# Patient Record
Sex: MALE | Race: WHITE | Employment: OTHER | URBAN - METROPOLITAN AREA
[De-identification: names, ages, dates, MRNs, and addresses within clinical notes are randomized per-mention and may not be internally consistent; named-entity substitution may affect disease eponyms.]

---

## 2019-05-31 ENCOUNTER — HOSPITAL ENCOUNTER (OUTPATIENT)
Dept: MRI IMAGING | Age: 54
Discharge: HOME | End: 2019-05-31
Payer: COMMERCIAL

## 2019-05-31 DIAGNOSIS — M48.02 CERVICAL SPINAL STENOSIS: ICD-10-CM

## 2019-05-31 PROCEDURE — 72141 MRI NECK SPINE W/O DYE: CPT

## 2019-06-27 ENCOUNTER — HOSPITAL ENCOUNTER (OUTPATIENT)
Dept: MRI IMAGING | Age: 54
Discharge: HOME | End: 2019-06-27
Payer: COMMERCIAL

## 2019-06-27 VITALS — WEIGHT: 230 LBS

## 2019-06-27 DIAGNOSIS — N88.8 CERVICAL CYST: ICD-10-CM

## 2019-06-27 PROCEDURE — 74011250636 HC RX REV CODE- 250/636: Performed by: RADIOLOGY

## 2019-06-27 PROCEDURE — A9575 INJ GADOTERATE MEGLUMI 0.1ML: HCPCS | Performed by: RADIOLOGY

## 2019-06-27 PROCEDURE — 72142 MRI NECK SPINE W/DYE: CPT

## 2019-06-27 RX ORDER — GADOTERATE MEGLUMINE 376.9 MG/ML
20 INJECTION INTRAVENOUS
Status: COMPLETED | OUTPATIENT
Start: 2019-06-27 | End: 2019-06-27

## 2019-06-27 RX ADMIN — GADOTERATE MEGLUMINE 20 ML: 376.9 INJECTION INTRAVENOUS at 09:50

## 2019-08-05 ENCOUNTER — HOSPITAL ENCOUNTER (EMERGENCY)
Age: 54
Discharge: HOME | End: 2019-08-05
Attending: EMERGENCY MEDICINE
Payer: COMMERCIAL

## 2019-08-05 VITALS
RESPIRATION RATE: 16 BRPM | HEIGHT: 75 IN | WEIGHT: 244.49 LBS | BODY MASS INDEX: 30.4 KG/M2 | TEMPERATURE: 97.6 F | DIASTOLIC BLOOD PRESSURE: 76 MMHG | SYSTOLIC BLOOD PRESSURE: 123 MMHG | OXYGEN SATURATION: 87 % | HEART RATE: 75 BPM

## 2019-08-05 DIAGNOSIS — R55 NEAR SYNCOPE: Primary | ICD-10-CM

## 2019-08-05 DIAGNOSIS — T50.905A ADVERSE EFFECT OF DRUG, INITIAL ENCOUNTER: ICD-10-CM

## 2019-08-05 LAB
ALBUMIN SERPL-MCNC: 3.9 G/DL (ref 3.5–5)
ALBUMIN/GLOB SERPL: 1.1 {RATIO} (ref 1.1–2.2)
ALP SERPL-CCNC: 65 U/L (ref 45–117)
ALT SERPL-CCNC: 71 U/L (ref 12–78)
ANION GAP SERPL CALC-SCNC: 5 MMOL/L (ref 5–15)
APPEARANCE UR: CLEAR
AST SERPL-CCNC: 34 U/L (ref 15–37)
ATRIAL RATE: 78 BPM
BACTERIA URNS QL MICRO: NEGATIVE /HPF
BASOPHILS # BLD: 0 K/UL (ref 0–0.1)
BASOPHILS NFR BLD: 1 % (ref 0–1)
BILIRUB SERPL-MCNC: 0.6 MG/DL (ref 0.2–1)
BILIRUB UR QL: NEGATIVE
BUN SERPL-MCNC: 12 MG/DL (ref 6–20)
BUN/CREAT SERPL: 12 (ref 12–20)
CALCIUM SERPL-MCNC: 8.8 MG/DL (ref 8.5–10.1)
CALCULATED P AXIS, ECG09: 37 DEGREES
CALCULATED R AXIS, ECG10: -10 DEGREES
CALCULATED T AXIS, ECG11: 36 DEGREES
CHLORIDE SERPL-SCNC: 103 MMOL/L (ref 97–108)
CO2 SERPL-SCNC: 32 MMOL/L (ref 21–32)
COLOR UR: ABNORMAL
CREAT SERPL-MCNC: 0.99 MG/DL (ref 0.7–1.3)
DIAGNOSIS, 93000: NORMAL
DIFFERENTIAL METHOD BLD: NORMAL
EOSINOPHIL # BLD: 0.1 K/UL (ref 0–0.4)
EOSINOPHIL NFR BLD: 2 % (ref 0–7)
EPITH CASTS URNS QL MICRO: ABNORMAL /LPF
ERYTHROCYTE [DISTWIDTH] IN BLOOD BY AUTOMATED COUNT: 12.7 % (ref 11.5–14.5)
GLOBULIN SER CALC-MCNC: 3.7 G/DL (ref 2–4)
GLUCOSE SERPL-MCNC: 101 MG/DL (ref 65–100)
GLUCOSE UR STRIP.AUTO-MCNC: NEGATIVE MG/DL
HCT VFR BLD AUTO: 44.6 % (ref 36.6–50.3)
HGB BLD-MCNC: 15.1 G/DL (ref 12.1–17)
HGB UR QL STRIP: ABNORMAL
KETONES UR QL STRIP.AUTO: NEGATIVE MG/DL
LEUKOCYTE ESTERASE UR QL STRIP.AUTO: NEGATIVE
LYMPHOCYTES # BLD: 2 K/UL
LYMPHOCYTES NFR BLD: 42 % (ref 12–49)
MCH RBC QN AUTO: 32.3 PG (ref 26–34)
MCHC RBC AUTO-ENTMCNC: 33.9 G/DL (ref 30–36.5)
MCV RBC AUTO: 95.3 FL (ref 80–99)
MONOCYTES # BLD: 0.4 K/UL (ref 0–1)
MONOCYTES NFR BLD: 9 % (ref 5–13)
NEUTS SEG # BLD: 2.2 K/UL (ref 1.8–8)
NEUTS SEG NFR BLD: 46 % (ref 32–75)
NITRITE UR QL STRIP.AUTO: NEGATIVE
P-R INTERVAL, ECG05: 186 MS
PH UR STRIP: 7 [PH] (ref 5–8)
PLATELET # BLD AUTO: 221 K/UL (ref 150–400)
PMV BLD AUTO: 11.2 FL (ref 8.9–12.9)
POTASSIUM SERPL-SCNC: 4 MMOL/L (ref 3.5–5.1)
PROT SERPL-MCNC: 7.6 G/DL (ref 6.4–8.2)
PROT UR STRIP-MCNC: NEGATIVE MG/DL
Q-T INTERVAL, ECG07: 404 MS
QRS DURATION, ECG06: 108 MS
QTC CALCULATION (BEZET), ECG08: 460 MS
RBC # BLD AUTO: 4.68 M/UL (ref 4.1–5.7)
RBC #/AREA URNS HPF: ABNORMAL /HPF (ref 0–5)
SODIUM SERPL-SCNC: 140 MMOL/L (ref 136–145)
SP GR UR REFRACTOMETRY: 1.01 (ref 1–1.03)
UR CULT HOLD, URHOLD: NORMAL
UROBILINOGEN UR QL STRIP.AUTO: 0.2 EU/DL (ref 0.2–1)
VENTRICULAR RATE, ECG03: 78 BPM
WBC # BLD AUTO: 4.8 K/UL (ref 4.1–11.1)
WBC URNS QL MICRO: ABNORMAL /HPF (ref 0–4)

## 2019-08-05 PROCEDURE — 74011250636 HC RX REV CODE- 250/636: Performed by: EMERGENCY MEDICINE

## 2019-08-05 PROCEDURE — 85025 COMPLETE CBC W/AUTO DIFF WBC: CPT

## 2019-08-05 PROCEDURE — 80053 COMPREHEN METABOLIC PANEL: CPT

## 2019-08-05 PROCEDURE — 99284 EMERGENCY DEPT VISIT MOD MDM: CPT

## 2019-08-05 PROCEDURE — 36415 COLL VENOUS BLD VENIPUNCTURE: CPT

## 2019-08-05 PROCEDURE — 96360 HYDRATION IV INFUSION INIT: CPT

## 2019-08-05 PROCEDURE — 93005 ELECTROCARDIOGRAM TRACING: CPT

## 2019-08-05 PROCEDURE — 81001 URINALYSIS AUTO W/SCOPE: CPT

## 2019-08-05 RX ORDER — SULFAMETHOXAZOLE AND TRIMETHOPRIM 800; 160 MG/1; MG/1
1 TABLET ORAL 2 TIMES DAILY
COMMUNITY
End: 2019-08-20

## 2019-08-05 RX ADMIN — SODIUM CHLORIDE 1000 ML: 900 INJECTION, SOLUTION INTRAVENOUS at 11:22

## 2019-08-05 NOTE — ED TRIAGE NOTES
Pt ambulates to treatment area with steady gait he states that he felt dizzy like he wanted to pass out this morning after taking the Bactrim that he started 4 days ago. He states that when he turns his head he feels some fogginess but not really dizzy. He had some mild nausea as well but no vomiting. He is concerned he maybe allergic to the Bactrim or is having a reaction to it.   Dr Yamilex Ellison at the bedside

## 2019-08-05 NOTE — ED NOTES
Patient resting on the stretcher in no distress and currently without complaints. Call bell within reach; will continue to monitor. Is able to text on his phone; no nausea or dizziness at this time.

## 2019-08-05 NOTE — ED PROVIDER NOTES
78-year-old male with no significant past medical history presents with complaint of lightheadedness. He reports getting up and getting ready for work this morning and feeling fine. At approximately 9 AM while he was getting ready for work he began to feel slightly nauseated, and said he felt foggy and lightheaded and became clammy. He reports episode lasted about 20 minutes and resolved on its own. He did not do anything to make the symptoms resolved. He had not yet eaten breakfast but he reports that he does not usually eat breakfast in the morning. He did take Bactrim by mouth this morning prior to the episode on an empty stomach  And he is concerned this could cause the symptoms. He reports being put on the Bactrim approximately 4 days ago by his primary care doctor for some lower abdominal discomfort. He said states his doctor found some blood and bacteria in his urine and is treating him for prostatitis. He reports since starting the antibiotics the symptoms have resolved. He denies any fevers. He denies any headache. Patient denies chest pain or shortness of breath. No past medical history on file. No past surgical history on file. No family history on file.     Social History     Socioeconomic History    Marital status:      Spouse name: Not on file    Number of children: Not on file    Years of education: Not on file    Highest education level: Not on file   Occupational History    Not on file   Social Needs    Financial resource strain: Not on file    Food insecurity:     Worry: Not on file     Inability: Not on file    Transportation needs:     Medical: Not on file     Non-medical: Not on file   Tobacco Use    Smoking status: Not on file   Substance and Sexual Activity    Alcohol use: No    Drug use: No    Sexual activity: Not on file   Lifestyle    Physical activity:     Days per week: Not on file     Minutes per session: Not on file    Stress: Not on file   Relationships    Social connections:     Talks on phone: Not on file     Gets together: Not on file     Attends Rastafari service: Not on file     Active member of club or organization: Not on file     Attends meetings of clubs or organizations: Not on file     Relationship status: Not on file    Intimate partner violence:     Fear of current or ex partner: Not on file     Emotionally abused: Not on file     Physically abused: Not on file     Forced sexual activity: Not on file   Other Topics Concern    Not on file   Social History Narrative    Not on file         ALLERGIES: Pcn [penicillins]    Review of Systems   Constitutional: Negative for fever. Respiratory: Negative for shortness of breath. Cardiovascular: Negative for chest pain. Neurological: Positive for dizziness and light-headedness. Negative for weakness, numbness and headaches. All other systems reviewed and are negative. There were no vitals filed for this visit. Physical Exam   Constitutional: He is oriented to person, place, and time. He appears well-developed and well-nourished. HENT:   Head: Normocephalic and atraumatic. Eyes: Pupils are equal, round, and reactive to light. Conjunctivae and EOM are normal.   Neck: Normal range of motion. Cardiovascular: Normal rate, regular rhythm, normal heart sounds and intact distal pulses. No murmur heard. Pulmonary/Chest: Breath sounds normal. No stridor. No respiratory distress. He has no wheezes. He has no rales. Abdominal: Soft. Bowel sounds are normal. He exhibits no distension and no mass. There is no tenderness. There is no guarding. Musculoskeletal: Normal range of motion. Neurological: He is alert and oriented to person, place, and time. No cranial nerve deficit. Coordination normal.   Finger to nose intact   Skin: Skin is warm and dry. Nursing note and vitals reviewed.        MDM  Number of Diagnoses or Management Options  Adverse effect of drug, initial encounter:   Near syncope:   Diagnosis management comments: Sounds more like near syncope based on the symptoms patient describes. Is not consistent with a stroke. It does not sound vertiginous. It may be related to taking antibiotics on an empty stomach. We will check electrolytes, platelets and white count given on Bactrim and recheck urine. Also check orthostatics. Amount and/or Complexity of Data Reviewed  Clinical lab tests: ordered and reviewed  Independent visualization of images, tracings, or specimens: yes (ekg)    Patient Progress  Patient progress: stable         Procedures    ED EKG interpretation:  Rhythm: normal sinus rhythm; and regular . Rate (approx.): 80; Axis: normal; P wave: normal; QRS interval: normal ; ST/T wave: non-specific changes  EKG documented by Gilberto Tejeda MD, scribe, as interpreted by Janis Cervantes MD, ED MD       12:33 PM  Patient reports feeling completely better. He is ambulate around emergency department and has had no symptoms. I instructed him next time he takes his Bactrim not to take it on empty stomach. 12:33 PM  Patient's results have been reviewed with them. Patient and/or family have verbally conveyed their understanding and agreement of the patient's signs, symptoms, diagnosis, treatment and prognosis and additionally agree to follow up as recommended or return to the Emergency Room should their condition change prior to follow-up. Discharge instructions have also been provided to the patient with some educational information regarding their diagnosis as well a list of reasons why they would want to return to the ER prior to their follow-up appointment should their condition change.

## 2019-08-05 NOTE — ED NOTES
Pt got up to go to the bathroom after fluids completed, pt had no feelings of dizziness or fog in his head states he is feeling better, informed Dr Nissa Lou

## 2019-08-05 NOTE — ED NOTES
The patient was discharged home by Dr Yamilex Ellison in stable condition. The patient is alert and oriented, in no respiratory distress. The patient's diagnosis, condition and treatment were explained. The patient expressed understanding. A discharge plan has been developed. A  was not involved in the process. Aftercare instructions were given. Pt ambulatory out of the ED.

## 2019-08-05 NOTE — DISCHARGE INSTRUCTIONS
Patient Education        Lightheadedness or Faintness: Care Instructions  Your Care Instructions  Lightheadedness is a feeling that you are about to faint or \"pass out. \" You do not feel as if you or your surroundings are moving. It is different from vertigo, which is the feeling that you or things around you are spinning or tilting. Lightheadedness usually goes away or gets better when you lie down. If lightheadedness gets worse, it can lead to a fainting spell. It is common to feel lightheaded from time to time. Lightheadedness usually is not caused by a serious problem. It often is caused by a short-lasting drop in blood pressure and blood flow to your head that occurs when you get up too quickly from a seated or lying position. Follow-up care is a key part of your treatment and safety. Be sure to make and go to all appointments, and call your doctor if you are having problems. It's also a good idea to know your test results and keep a list of the medicines you take. How can you care for yourself at home? · Lie down for 1 or 2 minutes when you feel lightheaded. After lying down, sit up slowly and remain sitting for 1 to 2 minutes before slowly standing up. · Avoid movements, positions, or activities that have made you lightheaded in the past.  · Get plenty of rest, especially if you have a cold or flu, which can cause lightheadedness. · Make sure you drink plenty of fluids, especially if you have a fever or have been sweating. · Do not drive or put yourself and others in danger while you feel lightheaded. When should you call for help? Call 911 anytime you think you may need emergency care. For example, call if:    · You have symptoms of a stroke. These may include:  ? Sudden numbness, tingling, weakness, or loss of movement in your face, arm, or leg, especially on only one side of your body. ? Sudden vision changes. ? Sudden trouble speaking.   ? Sudden confusion or trouble understanding simple statements. ? Sudden problems with walking or balance. ? A sudden, severe headache that is different from past headaches.     · You have symptoms of a heart attack. These may include:  ? Chest pain or pressure, or a strange feeling in the chest.  ? Sweating. ? Shortness of breath. ? Nausea or vomiting. ? Pain, pressure, or a strange feeling in the back, neck, jaw, or upper belly or in one or both shoulders or arms. ? Lightheadedness or sudden weakness. ? A fast or irregular heartbeat. After you call 911, the  may tell you to chew 1 adult-strength or 2 to 4 low-dose aspirin. Wait for an ambulance. Do not try to drive yourself.    Watch closely for changes in your health, and be sure to contact your doctor if:    · Your lightheadedness gets worse or does not get better with home care. Where can you learn more? Go to http://evelynConviobraden.info/. Enter G776 in the search box to learn more about \"Lightheadedness or Faintness: Care Instructions. \"  Current as of: September 23, 2018  Content Version: 12.1  © 9034-6618 [employer]. Care instructions adapted under license by [employer] (which disclaims liability or warranty for this information). If you have questions about a medical condition or this instruction, always ask your healthcare professional. [place] [address] any warranty or liability for your use of this information. Patient Education     Side Effects of Medicine: Care Instructions  Your Care Instructions  Medicines are a big part of treatment for many health problems. But all medicines have side effects. Often these are mild problems. They might include a dry mouth or upset stomach. But sometimes medicines can cause dangerous side effects. One example is a bad allergic reaction. The best treatment will depend on what side effects you have. If you have a serious side effect, you may need to stop taking the medicine.  You may also need to take another medicine to treat the side effect. If you have a mild side effect, it may go away after you take the medicine for a while. The doctor has checked you carefully, but problems can develop later. If you notice any problems or new symptoms, get medical treatment right away. Follow-up care is a key part of your treatment and safety. Be sure to make and go to all appointments, and call your doctor if you are having problems. It's also a good idea to know your test results and keep a list of the medicines you take. How can you care for yourself at home? · Be safe with medicines. Take your medicines exactly as prescribed. Call your doctor if you think you are having a problem with your medicine. · Call your doctor if side effects bother you and you wonder if you should keep taking a medicine. Your doctor may be able to lower your dose or change your medicine. Do not suddenly quit taking your medicine unless a doctor tells you to. · Make sure your doctor has a list of all the medicines, vitamins, supplements, and herbal remedies you take. Ask about side effects. When should you call for help? Call 911 anytime you think you may need emergency care. For example, call if:  · You have symptoms of a severe allergic reaction. These may include:  ¨ Sudden raised, red areas (hives) all over your body. ¨ Swelling of the throat, mouth, lips, or tongue. ¨ Trouble breathing. ¨ Passing out (losing consciousness). Or you may feel very lightheaded or suddenly feel weak, confused, or restless. Call your doctor now or seek immediate medical care if:  · You have symptoms of an allergic reaction, such as:  ¨ A rash or hives (raised, red areas on the skin). ¨ Itching. ¨ Swelling. ¨ Belly pain, nausea, or vomiting. Watch closely for changes in your health, and be sure to contact your doctor if:  · You think you are having a new problem with your medicine. · You do not get better as expected.    Where can you learn more? Go to Indiewalls.be  Enter D152 in the search box to learn more about \"Side Effects of Medicine: Care Instructions. \"   © 4589-8767 Healthwise[employer]. Care instructions adapted under license by [employer] (which disclaims liability or warranty for this information). This care instruction is for use with your licensed healthcare professional. If you have questions about a medical condition or this instruction, always ask your healthcare professional. [place] [address] any warranty or liability for your use of this information.   Content Version: 92.6.948773; Current as of: November 20, 2015

## 2019-08-20 ENCOUNTER — APPOINTMENT (OUTPATIENT)
Dept: GENERAL RADIOLOGY | Age: 54
End: 2019-08-20
Attending: EMERGENCY MEDICINE
Payer: COMMERCIAL

## 2019-08-20 ENCOUNTER — APPOINTMENT (OUTPATIENT)
Dept: NON INVASIVE DIAGNOSTICS | Age: 54
End: 2019-08-20
Attending: EMERGENCY MEDICINE
Payer: COMMERCIAL

## 2019-08-20 ENCOUNTER — HOSPITAL ENCOUNTER (EMERGENCY)
Age: 54
Discharge: ANOTHER HOSPITAL | End: 2019-08-20
Attending: EMERGENCY MEDICINE | Admitting: EMERGENCY MEDICINE
Payer: COMMERCIAL

## 2019-08-20 VITALS
DIASTOLIC BLOOD PRESSURE: 72 MMHG | WEIGHT: 241 LBS | HEART RATE: 87 BPM | SYSTOLIC BLOOD PRESSURE: 115 MMHG | TEMPERATURE: 97.6 F | HEIGHT: 75 IN | BODY MASS INDEX: 29.97 KG/M2 | OXYGEN SATURATION: 94 % | RESPIRATION RATE: 16 BRPM

## 2019-08-20 DIAGNOSIS — R42 DIZZINESS: Primary | ICD-10-CM

## 2019-08-20 LAB
ALBUMIN SERPL-MCNC: 3.9 G/DL (ref 3.5–5)
ALBUMIN/GLOB SERPL: 1 {RATIO} (ref 1.1–2.2)
ALP SERPL-CCNC: 69 U/L (ref 45–117)
ALT SERPL-CCNC: 39 U/L (ref 12–78)
ANION GAP SERPL CALC-SCNC: 6 MMOL/L (ref 5–15)
AST SERPL-CCNC: 30 U/L (ref 15–37)
ATRIAL RATE: 78 BPM
BASOPHILS # BLD: 0 K/UL (ref 0–0.1)
BASOPHILS NFR BLD: 0 % (ref 0–1)
BILIRUB SERPL-MCNC: 0.6 MG/DL (ref 0.2–1)
BUN SERPL-MCNC: 10 MG/DL (ref 6–20)
BUN/CREAT SERPL: 12 (ref 12–20)
CALCIUM SERPL-MCNC: 9.4 MG/DL (ref 8.5–10.1)
CALCULATED P AXIS, ECG09: 24 DEGREES
CALCULATED R AXIS, ECG10: -13 DEGREES
CALCULATED T AXIS, ECG11: 34 DEGREES
CHLORIDE SERPL-SCNC: 106 MMOL/L (ref 97–108)
CK MB CFR SERPL CALC: NORMAL % (ref 0–2.5)
CK MB SERPL-MCNC: <1 NG/ML (ref 5–25)
CK SERPL-CCNC: 85 U/L (ref 39–308)
CO2 SERPL-SCNC: 31 MMOL/L (ref 21–32)
CREAT SERPL-MCNC: 0.85 MG/DL (ref 0.7–1.3)
D DIMER PPP FEU-MCNC: <0.19 MG/L FEU (ref 0–0.65)
DIAGNOSIS, 93000: NORMAL
DIFFERENTIAL METHOD BLD: NORMAL
ECHO AO ROOT DIAM: 3.79 CM
EOSINOPHIL # BLD: 0 K/UL (ref 0–0.4)
EOSINOPHIL NFR BLD: 0 % (ref 0–7)
ERYTHROCYTE [DISTWIDTH] IN BLOOD BY AUTOMATED COUNT: 12.5 % (ref 11.5–14.5)
GLOBULIN SER CALC-MCNC: 3.8 G/DL (ref 2–4)
GLUCOSE SERPL-MCNC: 99 MG/DL (ref 65–100)
HCT VFR BLD AUTO: 43.8 % (ref 36.6–50.3)
HGB BLD-MCNC: 15.3 G/DL (ref 12.1–17)
LYMPHOCYTES # BLD: 1.9 K/UL
LYMPHOCYTES NFR BLD: 34 % (ref 12–49)
MCH RBC QN AUTO: 32.8 PG (ref 26–34)
MCHC RBC AUTO-ENTMCNC: 34.9 G/DL (ref 30–36.5)
MCV RBC AUTO: 93.8 FL (ref 80–99)
MONOCYTES # BLD: 0.4 K/UL (ref 0–1)
MONOCYTES NFR BLD: 7 % (ref 5–13)
NEUTS SEG # BLD: 3.2 K/UL (ref 1.8–8)
NEUTS SEG NFR BLD: 59 % (ref 32–75)
P-R INTERVAL, ECG05: 190 MS
PLATELET # BLD AUTO: 218 K/UL (ref 150–400)
PMV BLD AUTO: 10.6 FL (ref 8.9–12.9)
POTASSIUM SERPL-SCNC: 4.5 MMOL/L (ref 3.5–5.1)
PROT SERPL-MCNC: 7.7 G/DL (ref 6.4–8.2)
Q-T INTERVAL, ECG07: 370 MS
QRS DURATION, ECG06: 100 MS
QTC CALCULATION (BEZET), ECG08: 421 MS
RBC # BLD AUTO: 4.67 M/UL (ref 4.1–5.7)
SODIUM SERPL-SCNC: 143 MMOL/L (ref 136–145)
STRESS ANGINA INDEX: 0
STRESS BASELINE DIAS BP: 65 MMHG
STRESS BASELINE HR: 74 BPM
STRESS BASELINE SYS BP: 113 MMHG
STRESS ESTIMATED WORKLOAD: 13.4 METS
STRESS EXERCISE DUR MIN: NORMAL
STRESS PEAK DIAS BP: 90 MMHG
STRESS PEAK SYS BP: 150 MMHG
STRESS PERCENT HR ACHIEVED: 111 %
STRESS POST PEAK HR: 187 BPM
STRESS RATE PRESSURE PRODUCT: NORMAL BPM*MMHG
STRESS SR DUKE TREADMILL SCORE: 0
STRESS ST DEPRESSION: 0 MM
STRESS ST ELEVATION: 0 MM
STRESS TARGET HR: 168 BPM
TROPONIN I BLD-MCNC: <0.04 NG/ML (ref 0–0.08)
VENTRICULAR RATE, ECG03: 78 BPM
WBC # BLD AUTO: 5.5 K/UL (ref 4.1–11.1)

## 2019-08-20 PROCEDURE — 99285 EMERGENCY DEPT VISIT HI MDM: CPT

## 2019-08-20 PROCEDURE — 71046 X-RAY EXAM CHEST 2 VIEWS: CPT

## 2019-08-20 PROCEDURE — 84484 ASSAY OF TROPONIN QUANT: CPT

## 2019-08-20 PROCEDURE — 93005 ELECTROCARDIOGRAM TRACING: CPT

## 2019-08-20 PROCEDURE — 93320 DOPPLER ECHO COMPLETE: CPT

## 2019-08-20 PROCEDURE — 36415 COLL VENOUS BLD VENIPUNCTURE: CPT

## 2019-08-20 PROCEDURE — 80053 COMPREHEN METABOLIC PANEL: CPT

## 2019-08-20 PROCEDURE — 85379 FIBRIN DEGRADATION QUANT: CPT

## 2019-08-20 PROCEDURE — 85025 COMPLETE CBC W/AUTO DIFF WBC: CPT

## 2019-08-20 PROCEDURE — 82550 ASSAY OF CK (CPK): CPT

## 2019-08-20 NOTE — DISCHARGE INSTRUCTIONS
Patient Education        Dizziness: Care Instructions  Your Care Instructions  Dizziness is the feeling of unsteadiness or fuzziness in your head. It is different than having vertigo, which is a feeling that the room is spinning or that you are moving or falling. It is also different from lightheadedness, which is the feeling that you are about to faint. It can be hard to know what causes dizziness. Some people feel dizzy when they have migraine headaches. Sometimes bouts of flu can make you feel dizzy. Some medical conditions, such as heart problems or high blood pressure, can make you feel dizzy. Many medicines can cause dizziness, including medicines for high blood pressure, pain, or anxiety. If a medicine causes your symptoms, your doctor may recommend that you stop or change the medicine. If it is a problem with your heart, you may need medicine to help your heart work better. If there is no clear reason for your symptoms, your doctor may suggest watching and waiting for a while to see if the dizziness goes away on its own. Follow-up care is a key part of your treatment and safety. Be sure to make and go to all appointments, and call your doctor if you are having problems. It's also a good idea to know your test results and keep a list of the medicines you take. How can you care for yourself at home? · If your doctor recommends or prescribes medicine, take it exactly as directed. Call your doctor if you think you are having a problem with your medicine. · Do not drive while you feel dizzy. · Try to prevent falls. Steps you can take include:  ? Using nonskid mats, adding grab bars near the tub, and using night-lights. ? Clearing your home so that walkways are free of anything you might trip on.  ? Letting family and friends know that you have been feeling dizzy. This will help them know how to help you. When should you call for help? Call 911 anytime you think you may need emergency care.  For example, call if:    · You passed out (lost consciousness).     · You have dizziness along with symptoms of a heart attack. These may include:  ? Chest pain or pressure, or a strange feeling in the chest.  ? Sweating. ? Shortness of breath. ? Nausea or vomiting. ? Pain, pressure, or a strange feeling in the back, neck, jaw, or upper belly or in one or both shoulders or arms. ? Lightheadedness or sudden weakness. ? A fast or irregular heartbeat.     · You have symptoms of a stroke. These may include:  ? Sudden numbness, tingling, weakness, or loss of movement in your face, arm, or leg, especially on only one side of your body. ? Sudden vision changes. ? Sudden trouble speaking. ? Sudden confusion or trouble understanding simple statements. ? Sudden problems with walking or balance. ? A sudden, severe headache that is different from past headaches.    Call your doctor now or seek immediate medical care if:    · You feel dizzy and have a fever, headache, or ringing in your ears.     · You have new or increased nausea and vomiting.     · Your dizziness does not go away or comes back.    Watch closely for changes in your health, and be sure to contact your doctor if:    · You do not get better as expected. Where can you learn more? Go to http://evelyn-braden.info/. Enter Q531 in the search box to learn more about \"Dizziness: Care Instructions. \"  Current as of: September 23, 2018  Content Version: 12.1  © 5498-2294 [employer]. Care instructions adapted under license by [employer] (which disclaims liability or warranty for this information). If you have questions about a medical condition or this instruction, always ask your healthcare professional. [place] [address] any warranty or liability for your use of this information. We hope that we have addressed all of your medical concerns.  The examination and treatment you received in the Emergency Department were for an emergent problem and were not intended as complete care. It is important that you follow up with your healthcare provider(s) for ongoing care. If your symptoms worsen or do not improve as expected, and you are unable to reach your usual health care provider(s), you should return to the Emergency Department. Today's healthcare is undergoing tremendous change, and patient satisfaction surveys are one of the many tools to assess the quality of medical care. You may receive a survey from the [facility] regarding your experience in the Emergency Department. I hope that your experience has been completely positive, particularly the medical care that I provided. As such, please participate in the survey; anything less than excellent does not meet my expectations or intentions. [facility] and [facility] participate in nationally recognized quality of care measures. If your blood pressure is greater than 120/80, as reported below, we urge that you seek medical care to address the potential of high blood pressure, commonly known as hypertension. Hypertension can be hereditary or can be caused by certain medical conditions, pain, stress, or \"white coat syndrome. \"       Please make an appointment with your health care provider(s) for follow up of your Emergency Department visit. VITALS:   Patient Vitals for the past 8 hrs:   Temp Pulse Resp BP SpO2   08/20/19 1630 97.6 °F (36.4 °C) 87 16 115/72 94 %   08/20/19 1531 -- -- -- 113/65 --   08/20/19 1340 98.7 °F (37.1 °C) 74 16 -- --   08/20/19 1338 -- -- -- 122/84 --   08/20/19 1321 -- 76 21 124/70 --   08/20/19 1152 98.9 °F (37.2 °C) 84 16 127/58 96 %          Thank you for allowing us to provide you with medical care today. We realize that you have many choices for your emergency care needs. Please choose us in the future for any continued health care needs. Thomas Roldan Saima Shannan, [facility].   Office: [phone]            Recent Results (from the past 24 hour(s))   EKG, 12 LEAD, INITIAL    Collection Time: 08/20/19 12:10 PM   Result Value Ref Range    Ventricular Rate 78 BPM    Atrial Rate 78 BPM    P-R Interval 190 ms    QRS Duration 100 ms    Q-T Interval 370 ms    QTC Calculation (Bezet) 421 ms    Calculated P Axis 24 degrees    Calculated R Axis -13 degrees    Calculated T Axis 34 degrees    Diagnosis       Normal sinus rhythm  Normal ECG  When compared with ECG of 05-AUG-2019 10:10,  No significant change was found  Confirmed by Fidelia Sears MD., [facility] ([id number]) on 8/20/2019 3:23:13 PM     CBC WITH AUTOMATED DIFF    Collection Time: 08/20/19 12:11 PM   Result Value Ref Range    WBC 5.5 4.1 - 11.1 K/uL    RBC 4.67 4.10 - 5.70 M/uL    HGB 15.3 12.1 - 17.0 g/dL    HCT 43.8 36.6 - 50.3 %    MCV 93.8 80.0 - 99.0 FL    MCH 32.8 26.0 - 34.0 PG    MCHC 34.9 30.0 - 36.5 g/dL    RDW 12.5 11.5 - 14.5 %    PLATELET 063 449 - 891 K/uL    MPV 10.6 8.9 - 12.9 FL    NEUTROPHILS 59 32 - 75 %    LYMPHOCYTES 34 12 - 49 %    MONOCYTES 7 5 - 13 %    EOSINOPHILS 0 0 - 7 %    BASOPHILS 0 0 - 1 %    ABS. NEUTROPHILS 3.2 1.8 - 8.0 K/UL    ABS. LYMPHOCYTES 1.9 K/UL    ABS. MONOCYTES 0.4 0.0 - 1.0 K/UL    ABS. EOSINOPHILS 0.0 0.0 - 0.4 K/UL    ABS.  BASOPHILS 0.0 0.0 - 0.1 K/UL    DF AUTOMATED     METABOLIC PANEL, COMPREHENSIVE    Collection Time: 08/20/19 12:11 PM   Result Value Ref Range    Sodium 143 136 - 145 mmol/L    Potassium 4.5 3.5 - 5.1 mmol/L    Chloride 106 97 - 108 mmol/L    CO2 31 21 - 32 mmol/L    Anion gap 6 5 - 15 mmol/L    Glucose 99 65 - 100 mg/dL    BUN 10 6 - 20 MG/DL    Creatinine 0.85 0.70 - 1.30 MG/DL    BUN/Creatinine ratio 12 12 - 20      GFR est AA >60 >60 ml/min/1.73m2    GFR est non-AA >60 >60 ml/min/1.73m2    Calcium 9.4 8.5 - 10.1 MG/DL    Bilirubin, total 0.6 0.2 - 1.0 MG/DL    ALT (SGPT) 39 12 - 78 U/L    AST (SGOT) 30 15 - 37 U/L    Alk. phosphatase 69 45 - 117 U/L    Protein, total 7.7 6.4 - 8.2 g/dL    Albumin 3.9 3.5 - 5.0 g/dL    Globulin 3.8 2.0 - 4.0 g/dL    A-G Ratio 1.0 (L) 1.1 - 2.2     CK W/ CKMB & INDEX    Collection Time: 08/20/19 12:11 PM   Result Value Ref Range    CK 85 39 - 308 U/L    CK - MB <1.0 <3.6 NG/ML    CK-MB Index Cannot be calculated 0.0 - 2.5     D DIMER    Collection Time: 08/20/19 12:11 PM   Result Value Ref Range    D-dimer <0.19 0.00 - 0.65 mg/L FEU   POC TROPONIN-I    Collection Time: 08/20/19 12:24 PM   Result Value Ref Range    Troponin-I (POC) <0.04 0.00 - 0.08 ng/mL   ECHO STRESS    Collection Time: 08/20/19  4:14 PM   Result Value Ref Range    Target  bpm    Ao Root D 3.79 cm    Exercise duration time 00:10:01     Stress Base Systolic  mmHg    Stress Base Diastolic BP 90 mmHg    Post peak  BPM    Baseline HR 74 BPM    Estimated workload 13.4 METS    Baseline  mmHg    Percent  %    ST Elevation (mm) 0 mm    ST Depression (mm) 0 mm    Angina Index 0     Stress Base Diastolic BP 65 mmHg    Stress Rate Pressure Product 28,050 BPM*mmHg    STRESS SR EVANS TREADMILL SCORE 0        Xr Chest Pa Lat    Result Date: 8/20/2019  Chest PA and lateral History: Chest pain Comparison: None Findings: The lungs are well expanded. No focal consolidation, pleural effusion, or pneumothorax. The cardiomediastinal silhouette is unremarkable. The visualized osseous structures are unremarkable. Impression: No acute cardiopulmonary process.

## 2019-08-20 NOTE — ED NOTES
The patient was discharged home by Dr Nissa Perry in stable condition. The patient is alert and oriented, in no respiratory distress. The patient's diagnosis, condition and treatment were explained. The patient expressed understanding. A discharge plan has been developed. A  was not involved in the process. Aftercare instructions were given. Pt ambulatory out of the ED.

## 2019-08-20 NOTE — ED TRIAGE NOTES
Patient c/o preexisting dizziness, upset about trying to get a cardiologist. Was seen here recently for a \"medication reaction. \" Slight nausea. Questionable shortness of breath since last visit to the ED. Patient has been texting and walking and now while sitting on the stretcher so far for the visit.  Ambulates with a steady gait

## 2019-08-20 NOTE — ED NOTES
TRANSFER - OUT REPORT:    Verbal report given to [facility] Paramedic (name) on Franklyn Olszewski  being transferred to [facility] Stress Lab (unit) for routine progression of care       Report consisted of patients Situation, Background, Assessment and   Recommendations(SBAR). Information from the following report(s) SBAR was reviewed with the receiving nurse. Lines:   Peripheral IV 08/20/19 Left Antecubital (Active)   Site Assessment Clean, dry, & intact 8/20/2019 12:27 PM   Phlebitis Assessment 0 8/20/2019 12:27 PM   Infiltration Assessment 0 8/20/2019 12:27 PM   Dressing Status Clean, dry, & intact 8/20/2019 12:27 PM   Dressing Type Transparent 8/20/2019 12:27 PM   Hub Color/Line Status Pink 8/20/2019 12:27 PM   Action Taken Blood drawn 8/20/2019 12:27 PM        Opportunity for questions and clarification was provided.       Patient transported with:   Monitor

## 2019-08-20 NOTE — ED NOTES
On cardiac monitor, has been drinking lots of water today and has needed to void multiple times today. No chest pain and no shortness of breath at this time. Awaiting transfer to Stress Lab at [facility] by FELA. Attempting to call report to lab now, unable to get through at this time.

## 2019-08-20 NOTE — ED NOTES
Report received by Dulce Maria Aquino RN at the Stress lab at [facility]. They will draw his 2nd troponin that is due at 1430 there.

## 2019-08-20 NOTE — ED PROVIDER NOTES
HPI   46 yo M presents with lightheadedness onset a few weeks ago. Pt states he feels foggy in his head. Feels tightness in his face and neck and then feels sob. Denies cp. Feels like it's hard to take a deep breath. No pain or swelling in legs. Does not experience symptoms on exertion. Was working outside yesterday and did not experience symptoms. Has seen PCP and had additional testing done. Is working to get an appointment with cardiology. FH-grandparents with CAD, no early CAD   Past Medical History:   Diagnosis Date    UTI (urinary tract infection)        History reviewed. No pertinent surgical history. History reviewed. No pertinent family history.     Social History     Socioeconomic History    Marital status:      Spouse name: Not on file    Number of children: Not on file    Years of education: Not on file    Highest education level: Not on file   Occupational History    Not on file   Social Needs    Financial resource strain: Not on file    Food insecurity:     Worry: Not on file     Inability: Not on file    Transportation needs:     Medical: Not on file     Non-medical: Not on file   Tobacco Use    Smoking status: Never Smoker    Smokeless tobacco: Never Used   Substance and Sexual Activity    Alcohol use: Yes     Comment: occassionally    Drug use: No    Sexual activity: Not on file   Lifestyle    Physical activity:     Days per week: Not on file     Minutes per session: Not on file    Stress: Not on file   Relationships    Social connections:     Talks on phone: Not on file     Gets together: Not on file     Attends Jain service: Not on file     Active member of club or organization: Not on file     Attends meetings of clubs or organizations: Not on file     Relationship status: Not on file    Intimate partner violence:     Fear of current or ex partner: Not on file     Emotionally abused: Not on file     Physically abused: Not on file     Forced sexual activity: Not on file   Other Topics Concern    Not on file   Social History Narrative    Not on file         ALLERGIES: Pcn [penicillins]    Review of Systems   Constitutional: Negative for chills and fever. Respiratory: Positive for shortness of breath. Negative for cough. Cardiovascular: Negative for chest pain, palpitations and leg swelling. Gastrointestinal: Negative for abdominal pain, blood in stool, diarrhea, nausea and vomiting. Neurological: Positive for light-headedness. Negative for dizziness, weakness and numbness. All other systems reviewed and are negative.       Vitals:    08/20/19 1152   BP: 127/58   Pulse: 84   Resp: 16   Temp: 98.9 °F (37.2 °C)   SpO2: 96%   Weight: 109.7 kg (241 lb 13.5 oz)   Height: 6' 3\" (1.905 m)            Physical Exam   Physical Examination: General appearance - alert, well appearing, and in no distress, oriented to person, place, and time and normal appearing weight  Eyes - pupils equal and reactive, extraocular eye movements intact  Neck - supple, no significant adenopathy  Chest - clear to auscultation, no wheezes, rales or rhonchi, symmetric air entry  Heart - normal rate, regular rhythm, normal S1, S2, no murmurs, rubs, clicks or gallops  Abdomen - soft, nontender, nondistended, no masses or organomegaly  Back exam - full range of motion, no tenderness, palpable spasm or pain on motion  Neurological - alert, oriented, normal speech, no focal findings or movement disorder noted, normal f-n-f, no nystagmus, no pronator drift  Musculoskeletal - no joint tenderness, deformity or swelling  Extremities - peripheral pulses normal, no pedal edema, no clubbing or cyanosis  Skin - normal coloration and turgor, no rashes, no suspicious skin lesions noted  MDM  Number of Diagnoses or Management Options     Amount and/or Complexity of Data Reviewed  Clinical lab tests: ordered and reviewed  Decide to obtain previous medical records or to obtain history from someone other than the patient: yes  Obtain history from someone other than the patient: yes (son)  Review and summarize past medical records: yes  Independent visualization of images, tracings, or specimens: yes    Patient Progress  Patient progress: stable         Procedures  ED EKG interpretation:  Rhythm: normal sinus rhythm; and regular . Rate (approx.): 78; Axis: left axis deviation; P wave: normal; QRS interval: normal ; ST/T wave: non-specific changes;   EKG documented by Cortes Smith MD    Discussed with Dr. Maria Del Carmen Taylor, cardiology. Agrees with plan for stress test.    Discussed with Dr. Tristin Chan, ED physician at [address] [place] [address].  Advised pt arriving for stress test.

## 2019-11-03 ENCOUNTER — APPOINTMENT (OUTPATIENT)
Dept: CT IMAGING | Age: 54
End: 2019-11-03
Attending: NURSE PRACTITIONER
Payer: COMMERCIAL

## 2019-11-03 ENCOUNTER — HOSPITAL ENCOUNTER (EMERGENCY)
Age: 54
Discharge: HOME | End: 2019-11-03
Attending: EMERGENCY MEDICINE
Payer: COMMERCIAL

## 2019-11-03 VITALS
OXYGEN SATURATION: 97 % | HEART RATE: 77 BPM | TEMPERATURE: 97.8 F | DIASTOLIC BLOOD PRESSURE: 88 MMHG | SYSTOLIC BLOOD PRESSURE: 138 MMHG | RESPIRATION RATE: 18 BRPM

## 2019-11-03 DIAGNOSIS — S39.011A ABDOMINAL WALL STRAIN, INITIAL ENCOUNTER: Primary | ICD-10-CM

## 2019-11-03 LAB
ALBUMIN SERPL-MCNC: 3.8 G/DL (ref 3.5–5)
ALBUMIN/GLOB SERPL: 1 {RATIO} (ref 1.1–2.2)
ALP SERPL-CCNC: 72 U/L (ref 45–117)
ALT SERPL-CCNC: 42 U/L (ref 12–78)
ANION GAP SERPL CALC-SCNC: 7 MMOL/L (ref 5–15)
APPEARANCE UR: CLEAR
AST SERPL-CCNC: 30 U/L (ref 15–37)
BASOPHILS # BLD: 0 K/UL (ref 0–0.1)
BASOPHILS NFR BLD: 1 % (ref 0–1)
BILIRUB SERPL-MCNC: 0.4 MG/DL (ref 0.2–1)
BILIRUB UR QL: NEGATIVE
BUN SERPL-MCNC: 14 MG/DL (ref 6–20)
BUN/CREAT SERPL: 16 (ref 12–20)
CALCIUM SERPL-MCNC: 9 MG/DL (ref 8.5–10.1)
CHLORIDE SERPL-SCNC: 108 MMOL/L (ref 97–108)
CO2 SERPL-SCNC: 27 MMOL/L (ref 21–32)
COLOR UR: NORMAL
CREAT SERPL-MCNC: 0.86 MG/DL (ref 0.7–1.3)
DIFFERENTIAL METHOD BLD: NORMAL
EOSINOPHIL # BLD: 0.1 K/UL (ref 0–0.4)
EOSINOPHIL NFR BLD: 1 % (ref 0–7)
ERYTHROCYTE [DISTWIDTH] IN BLOOD BY AUTOMATED COUNT: 12.7 % (ref 11.5–14.5)
GLOBULIN SER CALC-MCNC: 4 G/DL (ref 2–4)
GLUCOSE SERPL-MCNC: 87 MG/DL (ref 65–100)
GLUCOSE UR STRIP.AUTO-MCNC: NEGATIVE MG/DL
HCT VFR BLD AUTO: 45.2 % (ref 36.6–50.3)
HGB BLD-MCNC: 15.3 G/DL (ref 12.1–17)
HGB UR QL STRIP: NEGATIVE
IMM GRANULOCYTES # BLD AUTO: 0 K/UL (ref 0–0.04)
IMM GRANULOCYTES NFR BLD AUTO: 0 % (ref 0–0.5)
KETONES UR QL STRIP.AUTO: NEGATIVE MG/DL
LEUKOCYTE ESTERASE UR QL STRIP.AUTO: NEGATIVE
LIPASE SERPL-CCNC: 182 U/L (ref 73–393)
LYMPHOCYTES # BLD: 2.4 K/UL (ref 0.8–3.5)
LYMPHOCYTES NFR BLD: 38 % (ref 12–49)
MCH RBC QN AUTO: 32.2 PG (ref 26–34)
MCHC RBC AUTO-ENTMCNC: 33.8 G/DL (ref 30–36.5)
MCV RBC AUTO: 95.2 FL (ref 80–99)
MONOCYTES # BLD: 0.5 K/UL (ref 0–1)
MONOCYTES NFR BLD: 8 % (ref 5–13)
NEUTS SEG # BLD: 3.3 K/UL (ref 1.8–8)
NEUTS SEG NFR BLD: 52 % (ref 32–75)
NITRITE UR QL STRIP.AUTO: NEGATIVE
NRBC # BLD: 0 K/UL (ref 0–0.01)
NRBC BLD-RTO: 0 PER 100 WBC
PH UR STRIP: 6 [PH] (ref 5–8)
PLATELET # BLD AUTO: 227 K/UL (ref 150–400)
PMV BLD AUTO: 10.9 FL (ref 8.9–12.9)
POTASSIUM SERPL-SCNC: 3.7 MMOL/L (ref 3.5–5.1)
PROT SERPL-MCNC: 7.8 G/DL (ref 6.4–8.2)
PROT UR STRIP-MCNC: NEGATIVE MG/DL
RBC # BLD AUTO: 4.75 M/UL (ref 4.1–5.7)
SODIUM SERPL-SCNC: 142 MMOL/L (ref 136–145)
SP GR UR REFRACTOMETRY: 1.01 (ref 1–1.03)
UROBILINOGEN UR QL STRIP.AUTO: 0.2 EU/DL (ref 0.2–1)
WBC # BLD AUTO: 6.4 K/UL (ref 4.1–11.1)

## 2019-11-03 PROCEDURE — 74011250636 HC RX REV CODE- 250/636: Performed by: EMERGENCY MEDICINE

## 2019-11-03 PROCEDURE — 74011636320 HC RX REV CODE- 636/320: Performed by: RADIOLOGY

## 2019-11-03 PROCEDURE — 36415 COLL VENOUS BLD VENIPUNCTURE: CPT

## 2019-11-03 PROCEDURE — 74177 CT ABD & PELVIS W/CONTRAST: CPT

## 2019-11-03 PROCEDURE — 81003 URINALYSIS AUTO W/O SCOPE: CPT

## 2019-11-03 PROCEDURE — 83690 ASSAY OF LIPASE: CPT

## 2019-11-03 PROCEDURE — 80053 COMPREHEN METABOLIC PANEL: CPT

## 2019-11-03 PROCEDURE — 99283 EMERGENCY DEPT VISIT LOW MDM: CPT

## 2019-11-03 PROCEDURE — 85025 COMPLETE CBC W/AUTO DIFF WBC: CPT

## 2019-11-03 RX ADMIN — SODIUM CHLORIDE 1000 ML: 900 INJECTION, SOLUTION INTRAVENOUS at 16:23

## 2019-11-03 RX ADMIN — IOPAMIDOL 100 ML: 755 INJECTION, SOLUTION INTRAVENOUS at 16:43

## 2019-11-03 NOTE — ED PROVIDER NOTES
49 y.o. male with past medical history significant for UTI who presents ambulatory to ED with chief complaint of abdominal pain. Pt reports sharp RLQ abdominal pain and increased urinary frequency that began on 11/2/19 and worsened earlier today 11/3/19. Pt also reports mild nausea. Pt's last bowel movement was earlier today. No hx of abdominal surgeries. Pt denies any blood in stool, hematuria, chest pain, SOB, dizziness, emesis. PCP: Corey Melo, DO    I have evaluated the patient as the Provider in Triage. I have reviewed His vital signs and the triage nurse assessment. I have talked with the patient and any available family and advised that I am the provider in triage and have ordered the appropriate study to initiate their work up based on the clinical presentation during my assessment. I have advised that the patient will be accommodated in the Main ED as soon as possible. I have also requested to contact the triage nurse or myself immediately if the patient experiences any changes in their condition during this brief waiting period. Note written by Alan Tapia, as dictated by Rhonda Carter NP 3:26 PM.       _______________________    49 y.o. male with past medical history significant for UTI who presents via car to the ED with chief complaint of abdominal pain. Patient reports onset of RLQ abdominal pain yesterday that has become more consistent today. Patient reports that pain is exacerbated when he bends down or straightens up, but that pain does not radiate. Patient reports that his last bowel movent was today, and denies any recent heavy lifting. He denies taking any medications, having any medical problems, or history of kidney stones. Patient also denies history of abdominal surgeries. He specifically denies testicular pain, fever, diarrhea, hematuria, back pain, dysuria or decreased appetite. There are no other acute medical concerns at this time.     Social Hx: no Tobacco use, no EtOH use, no Illicit Drug use  PCP: Comfort Harley DO    Note written by Alan Culver, as dictated by Simin Wall MD 3:45 PM      The history is provided by the patient. No  was used. Past Medical History:   Diagnosis Date    UTI (urinary tract infection)        No past surgical history on file. No family history on file. Social History     Socioeconomic History    Marital status:      Spouse name: Not on file    Number of children: Not on file    Years of education: Not on file    Highest education level: Not on file   Occupational History    Not on file   Social Needs    Financial resource strain: Not on file    Food insecurity:     Worry: Not on file     Inability: Not on file    Transportation needs:     Medical: Not on file     Non-medical: Not on file   Tobacco Use    Smoking status: Never Smoker    Smokeless tobacco: Never Used   Substance and Sexual Activity    Alcohol use: Yes     Comment: occassionally    Drug use: No    Sexual activity: Not on file   Lifestyle    Physical activity:     Days per week: Not on file     Minutes per session: Not on file    Stress: Not on file   Relationships    Social connections:     Talks on phone: Not on file     Gets together: Not on file     Attends Sabianist service: Not on file     Active member of club or organization: Not on file     Attends meetings of clubs or organizations: Not on file     Relationship status: Not on file    Intimate partner violence:     Fear of current or ex partner: Not on file     Emotionally abused: Not on file     Physically abused: Not on file     Forced sexual activity: Not on file   Other Topics Concern    Not on file   Social History Narrative    Not on file         ALLERGIES: Pcn [penicillins]    Review of Systems   Constitutional: Negative for appetite change and fever. Gastrointestinal: Positive for abdominal pain.  Negative for diarrhea. Genitourinary: Negative for dysuria, hematuria and testicular pain. Musculoskeletal: Negative for back pain. All other systems reviewed and are negative. Vitals:    11/03/19 1528   BP: 138/88   Pulse: 77   Resp: 18   Temp: 97.8 °F (36.6 °C)   SpO2: 97%            Physical Exam   Constitutional: He is oriented to person, place, and time. He appears well-developed and well-nourished. HENT:   Head: Normocephalic and atraumatic. Eyes: Conjunctivae are normal.   Neck: Normal range of motion. Cardiovascular: Normal rate, regular rhythm, normal heart sounds and intact distal pulses. No murmur heard. Pulmonary/Chest: Effort normal and breath sounds normal. No stridor. No respiratory distress. He has no wheezes. He has no rales. Abdominal: Soft. Bowel sounds are normal. He exhibits no distension and no mass. There is tenderness. There is no rebound and no guarding. Minimal ttp rlq   Musculoskeletal: Normal range of motion. Neurological: He is alert and oriented to person, place, and time. Skin: Skin is warm and dry. Nursing note and vitals reviewed. MDM  Number of Diagnoses or Management Options  Abdominal wall strain, initial encounter:   Diagnosis management comments: Suspect sx are muscular in nature however will evaluate for appendicitis. Patient however has no other symptoms such as decreased appetite, fever, vomiting, diarrhea. The symptoms do not sound consistent with kidney stone. He vehemently denies any testicular pain I will defer exam.  This is an unlikely area as well for a hernia.        Amount and/or Complexity of Data Reviewed  Clinical lab tests: ordered and reviewed  Tests in the radiology section of CPT®: ordered and reviewed  Obtain history from someone other than the patient: yes (Wife)    Patient Progress  Patient progress: stable         Procedures    5:39 PM  Discussed CT finding with patient and wife as well as the finding of a small liver cyst.  There is no appendicitis. I suspect this is muscular. I offered NSAIDs and muscle relaxers but patient declined. He just wanted make sure it was not his appendix. I did tell him if anything changed or he had worsening symptoms to please return. Copy of CT was given in his discharge papers. He and wife agree with plan.

## 2019-11-03 NOTE — DISCHARGE INSTRUCTIONS
Patient Education               We hope that we have addressed all of your medical concerns. The examination and treatment you received in the Emergency Department were for an emergent problem and were not intended as complete care. It is important that you follow up with your healthcare provider(s) for ongoing care. If your symptoms worsen or do not improve as expected, and you are unable to reach your usual health care provider(s), you should return to the Emergency Department. Today's healthcare is undergoing tremendous change, and patient satisfaction surveys are one of the many tools to assess the quality of medical care. You may receive a survey from the [employer] regarding your experience in the Emergency Department. I hope that your experience has been completely positive, particularly the medical care that I provided. As such, please participate in the survey; anything less than excellent does not meet my expectations or intentions. [facility] and [facility] participate in nationally recognized quality of care measures. If your blood pressure is greater than 120/80, as reported below, we urge that you seek medical care to address the potential of high blood pressure, commonly known as hypertension. Hypertension can be hereditary or can be caused by certain medical conditions, pain, stress, or \"white coat syndrome. \"       Please make an appointment with your health care provider(s) for follow up of your Emergency Department visit. VITALS:   Patient Vitals for the past 8 hrs:   Temp Pulse Resp BP SpO2   11/03/19 1528 97.8 °F (36.6 °C) 77 18 138/88 97 %          Thank you for allowing us to provide you with medical care today. We realize that you have many choices for your emergency care needs. Please choose us in the future for any continued health care needs.       Cesar Yee MD    [facility] Emergency [address] [facility].   Office: [phone]            Recent Results (from the past 24 hour(s))   CBC WITH AUTOMATED DIFF    Collection Time: 11/03/19  3:43 PM   Result Value Ref Range    WBC 6.4 4.1 - 11.1 K/uL    RBC 4.75 4.10 - 5.70 M/uL    HGB 15.3 12.1 - 17.0 g/dL    HCT 45.2 36.6 - 50.3 %    MCV 95.2 80.0 - 99.0 FL    MCH 32.2 26.0 - 34.0 PG    MCHC 33.8 30.0 - 36.5 g/dL    RDW 12.7 11.5 - 14.5 %    PLATELET 597 624 - 300 K/uL    MPV 10.9 8.9 - 12.9 FL    NRBC 0.0 0  WBC    ABSOLUTE NRBC 0.00 0.00 - 0.01 K/uL    NEUTROPHILS 52 32 - 75 %    LYMPHOCYTES 38 12 - 49 %    MONOCYTES 8 5 - 13 %    EOSINOPHILS 1 0 - 7 %    BASOPHILS 1 0 - 1 %    IMMATURE GRANULOCYTES 0 0.0 - 0.5 %    ABS. NEUTROPHILS 3.3 1.8 - 8.0 K/UL    ABS. LYMPHOCYTES 2.4 0.8 - 3.5 K/UL    ABS. MONOCYTES 0.5 0.0 - 1.0 K/UL    ABS. EOSINOPHILS 0.1 0.0 - 0.4 K/UL    ABS. BASOPHILS 0.0 0.0 - 0.1 K/UL    ABS. IMM. GRANS. 0.0 0.00 - 0.04 K/UL    DF AUTOMATED     METABOLIC PANEL, COMPREHENSIVE    Collection Time: 11/03/19  3:43 PM   Result Value Ref Range    Sodium 142 136 - 145 mmol/L    Potassium 3.7 3.5 - 5.1 mmol/L    Chloride 108 97 - 108 mmol/L    CO2 27 21 - 32 mmol/L    Anion gap 7 5 - 15 mmol/L    Glucose 87 65 - 100 mg/dL    BUN 14 6 - 20 MG/DL    Creatinine 0.86 0.70 - 1.30 MG/DL    BUN/Creatinine ratio 16 12 - 20      GFR est AA >60 >60 ml/min/1.73m2    GFR est non-AA >60 >60 ml/min/1.73m2    Calcium 9.0 8.5 - 10.1 MG/DL    Bilirubin, total 0.4 0.2 - 1.0 MG/DL    ALT (SGPT) 42 12 - 78 U/L    AST (SGOT) 30 15 - 37 U/L    Alk.  phosphatase 72 45 - 117 U/L    Protein, total 7.8 6.4 - 8.2 g/dL    Albumin 3.8 3.5 - 5.0 g/dL    Globulin 4.0 2.0 - 4.0 g/dL    A-G Ratio 1.0 (L) 1.1 - 2.2     LIPASE    Collection Time: 11/03/19  3:43 PM   Result Value Ref Range    Lipase 182 73 - 393 U/L   URINALYSIS W/ RFLX MICROSCOPIC    Collection Time: 11/03/19  3:45 PM   Result Value Ref Range    Color YELLOW/STRAW      Appearance CLEAR CLEAR      Specific gravity 1.009 1.003 - 1.030      pH (UA) 6.0 5.0 - 8.0      Protein NEGATIVE  NEG mg/dL    Glucose NEGATIVE  NEG mg/dL    Ketone NEGATIVE  NEG mg/dL    Bilirubin NEGATIVE  NEG      Blood NEGATIVE  NEG      Urobilinogen 0.2 0.2 - 1.0 EU/dL    Nitrites NEGATIVE  NEG      Leukocyte Esterase NEGATIVE  NEG         Ct Abd Pelv W Cont    Result Date: 11/3/2019  EXAM: CT ABD PELV W CONT INDICATION: RLQ pain COMPARISON: None CONTRAST: 100 mL of Isovue-370. TECHNIQUE: Following the uneventful intravenous administration of contrast, thin axial images were obtained through the abdomen and pelvis. Coronal and sagittal reconstructions were generated. Oral contrast was not administered. CT dose reduction was achieved through use of a standardized protocol tailored for this examination and automatic exposure control for dose modulation. FINDINGS: LUNG BASES: Clear. INCIDENTALLY IMAGED HEART AND MEDIASTINUM: Unremarkable. LIVER: There is a 15 mm cyst in the right hepatic lobe. No mass or biliary dilatation. GALLBLADDER: Unremarkable. SPLEEN: No mass. PANCREAS: No mass or ductal dilatation. ADRENALS: Unremarkable. KIDNEYS: No mass, calculus, or hydronephrosis. STOMACH: Unremarkable. SMALL BOWEL: No dilatation or wall thickening. COLON: No dilatation or wall thickening. APPENDIX: Unremarkable. PERITONEUM: No ascites or pneumoperitoneum. RETROPERITONEUM: No lymphadenopathy or aortic aneurysm. REPRODUCTIVE ORGANS: Unremarkable. URINARY BLADDER: No mass or calculus. BONES: No destructive bone lesion. ADDITIONAL COMMENTS: N/A     IMPRESSION: No evidence of acute abnormality. Abdominal Strain: Rehab Exercises  Introduction  Here are some examples of exercises for you to try. The exercises may be suggested for a condition or for rehabilitation. Start each exercise slowly. Ease off the exercises if you start to have pain. You will be told when to start these exercises and which ones will work best for you.   How to do the exercises  Tummy tuck    1. Lie on your back with your knees bent. Place two fingers just inside your hip bones so you can feel your lower belly muscles. 2. Take a deep breath in.  3. As you breathe out, pull your belly button in toward your spine, as if you are trying to zip up a tight pair of jeans. You should feel your lower belly muscles pull slightly away from your fingers as the muscles tighten. 4. Hold for about 6 seconds, but do not hold your breath. 5. Relax up to 10 seconds. 6. Repeat 8 to 12 times. 7. Repeat several times a day, and try to hold your lower belly muscles in for longer as you get stronger. 8. Practice doing this exercise while you are standing, such as when you are standing in line, or sitting. Pelvic tilt    1. Lie on your back with your knees bent. 2. \"Brace\" your stomach--tighten your muscles by pulling in and imagining your belly button moving toward your spine. 3. Press your lower back into the floor. You should feel your hips and pelvis rock back. 4. Hold for 6 seconds while breathing smoothly. 5. Relax and allow your pelvis and hips to rock forward. 6. Repeat 8 to 12 times. Curl-up    1. Lie down with your knees bent and your arms at your sides. Keep your feet flat on the floor. 2. Lift your head and shoulders up a few inches. At the same time, raise your arms to about thigh level. 3. Hold for 6 seconds. 4. Relax and return to your starting position. 5. Repeat 8 to 12 times. Diagonal curl-up    1. Lie down with your knees bent and your arms at your sides. Keep your feet flat on the floor. 2. Lift your head and shoulders up. At the same time, reach to one side with both arms. 3. Hold for 6 seconds. 4. Relax and return to your starting position. 5. Repeat 8 to 12 times. 6. Repeat the same steps on your other side. Follow-up care is a key part of your treatment and safety.  Be sure to make and go to all appointments, and call your doctor if you are having problems. It's also a good idea to know your test results and keep a list of the medicines you take. Where can you learn more? Go to http://evelyn-braden.info/. Enter G147 in the search box to learn more about \"Abdominal Strain: Rehab Exercises. \"  Current as of: June 26, 2019  Content Version: 12.2  © 7573-6671 [employer], [employer]. Care instructions adapted under license by [employer] (which disclaims liability or warranty for this information). If you have questions about a medical condition or this instruction, always ask your healthcare professional. [place] [address] any warranty or liability for your use of this information.

## 2020-02-27 ENCOUNTER — HOSPITAL ENCOUNTER (OUTPATIENT)
Dept: MRI IMAGING | Age: 55
Discharge: HOME | End: 2020-02-27
Payer: COMMERCIAL

## 2020-02-27 VITALS — BODY MASS INDEX: 30 KG/M2 | WEIGHT: 240 LBS

## 2020-02-27 DIAGNOSIS — H90.5 RIGHT-SIDED SENSORINEURAL HEARING LOSS: ICD-10-CM

## 2020-02-27 PROCEDURE — A9575 INJ GADOTERATE MEGLUMI 0.1ML: HCPCS | Performed by: RADIOLOGY

## 2020-02-27 PROCEDURE — 74011250636 HC RX REV CODE- 250/636: Performed by: RADIOLOGY

## 2020-02-27 PROCEDURE — 70553 MRI BRAIN STEM W/O & W/DYE: CPT

## 2020-02-27 RX ORDER — GADOTERATE MEGLUMINE 376.9 MG/ML
20 INJECTION INTRAVENOUS
Status: COMPLETED | OUTPATIENT
Start: 2020-02-27 | End: 2020-02-27

## 2020-02-27 RX ADMIN — GADOTERATE MEGLUMINE 20 ML: 376.9 INJECTION INTRAVENOUS at 19:28

## 2020-05-22 ENCOUNTER — HOSPITAL ENCOUNTER (EMERGENCY)
Age: 55
Discharge: HOME | End: 2020-05-22
Attending: EMERGENCY MEDICINE
Payer: COMMERCIAL

## 2020-05-22 ENCOUNTER — APPOINTMENT (OUTPATIENT)
Dept: CT IMAGING | Age: 55
End: 2020-05-22
Attending: EMERGENCY MEDICINE
Payer: COMMERCIAL

## 2020-05-22 VITALS
RESPIRATION RATE: 16 BRPM | WEIGHT: 258.16 LBS | DIASTOLIC BLOOD PRESSURE: 67 MMHG | OXYGEN SATURATION: 97 % | HEIGHT: 75 IN | BODY MASS INDEX: 32.1 KG/M2 | SYSTOLIC BLOOD PRESSURE: 106 MMHG | TEMPERATURE: 97.6 F | HEART RATE: 63 BPM

## 2020-05-22 DIAGNOSIS — H92.01 RIGHT EAR PAIN: Primary | ICD-10-CM

## 2020-05-22 DIAGNOSIS — R20.2 FACIAL TINGLING SENSATION: ICD-10-CM

## 2020-05-22 PROCEDURE — 70450 CT HEAD/BRAIN W/O DYE: CPT

## 2020-05-22 PROCEDURE — 99283 EMERGENCY DEPT VISIT LOW MDM: CPT

## 2020-05-22 NOTE — ED PROVIDER NOTES
Date of Service:  5/22/2020    Patient:  Leonie Nunes    Chief Complaint:  Facial Pain       HPI:  Leonie Nunes is a 49 y.o.  male who presents for evaluation of what he describes as facial pain. She has no pain. He describes a tightness in the V2 distribution of his right face. He is been having some chronic type of ear discomfort for which she is seen ear nose and throat as well as neurology for. Yesterday he states that he got a headache that went across the midline of his forehead and then he started feeling more this tightness and decreased sensation to the V2 distribution on the right. Currently he feels okay. No numbness or tingling. He is concerned for possible stroke. Patient otherwise denies any other acute complaints or modifying factors           Past Medical History:   Diagnosis Date    UTI (urinary tract infection)        History reviewed. No pertinent surgical history. History reviewed. No pertinent family history.     Social History     Socioeconomic History    Marital status:      Spouse name: Not on file    Number of children: Not on file    Years of education: Not on file    Highest education level: Not on file   Occupational History    Not on file   Social Needs    Financial resource strain: Not on file    Food insecurity     Worry: Not on file     Inability: Not on file    Transportation needs     Medical: Not on file     Non-medical: Not on file   Tobacco Use    Smoking status: Never Smoker    Smokeless tobacco: Never Used   Substance and Sexual Activity    Alcohol use: Yes     Comment: occassionally    Drug use: No    Sexual activity: Not on file   Lifestyle    Physical activity     Days per week: Not on file     Minutes per session: Not on file    Stress: Not on file   Relationships    Social connections     Talks on phone: Not on file     Gets together: Not on file     Attends Hindu service: Not on file     Active member of club or organization: Not on file     Attends meetings of clubs or organizations: Not on file     Relationship status: Not on file    Intimate partner violence     Fear of current or ex partner: Not on file     Emotionally abused: Not on file     Physically abused: Not on file     Forced sexual activity: Not on file   Other Topics Concern    Not on file   Social History Narrative    Not on file         ALLERGIES: Pcn [penicillins]    Review of Systems   Constitutional: Negative for fever. HENT: Negative for hearing loss. Eyes: Negative for visual disturbance. Respiratory: Negative for shortness of breath. Cardiovascular: Negative for chest pain. Gastrointestinal: Negative for abdominal pain. Genitourinary: Negative for flank pain. Musculoskeletal: Negative for back pain. Skin: Negative for rash. Neurological: Positive for numbness and headaches. Negative for dizziness and light-headedness. Psychiatric/Behavioral: Negative for confusion. Vitals:    05/22/20 1350 05/22/20 1531   BP: 124/77 106/67   Pulse: 76 63   Resp: 16 16   Temp: 97.6 °F (36.4 °C)    SpO2: 97% 97%   Weight: 117.1 kg (258 lb 2.5 oz)    Height: 6' 3\" (1.905 m)             Physical Exam  Vitals signs and nursing note reviewed. Constitutional:       General: He is not in acute distress. Appearance: He is well-developed. HENT:      Head: Normocephalic and atraumatic. Right Ear: Tympanic membrane, ear canal and external ear normal. There is no impacted cerumen. Left Ear: Tympanic membrane, ear canal and external ear normal. There is no impacted cerumen. Eyes:      Conjunctiva/sclera: Conjunctivae normal.      Pupils: Pupils are equal, round, and reactive to light. Neck:      Musculoskeletal: Neck supple. Vascular: No JVD. Trachea: No tracheal deviation. Cardiovascular:      Rate and Rhythm: Normal rate and regular rhythm. Heart sounds: No murmur. No friction rub.    Pulmonary:      Effort: Pulmonary effort is normal. No respiratory distress. Breath sounds: Normal breath sounds. Abdominal:      General: There is no distension. Palpations: Abdomen is soft. Tenderness: There is no abdominal tenderness. Musculoskeletal:         General: No tenderness or deformity. Skin:     General: Skin is warm and dry. Capillary Refill: Capillary refill takes less than 2 seconds. Findings: No rash. Neurological:      Mental Status: He is alert. Comments:   Mental Status:  Oriented to time, place and person. Cranial Nerves: Intact visual fields. Fundi are benign. PERRL, EOM's full and intact, no nystagmus, no ptosis. Facial sensation is normal. Facial movement is symmetric. Palate is midline with normal sternocleidomastoid and trapezius muscle strength. Tongue is midline. Motor:  5/5 strength in upper and lower proximal and distal muscles. Normal bulk and tone. Reflexes:  Biceps and quadriceps tendon reflexes 2+ and symmetrical.     Sensory:  Normal to light touch. No obvious decrease in the V2 on the right. Gait:   Normal gait. Psychiatric:         Mood and Affect: Mood normal.         Behavior: Behavior normal.          MDM  Number of Diagnoses or Management Options  Facial tingling sensation:   Right ear pain:         VITAL SIGNS:  Patient Vitals for the past 4 hrs:   Pulse Resp BP SpO2   05/22/20 1531 63 16 106/67 97 %         LABS:  No results found for this or any previous visit (from the past 6 hour(s)). IMAGING:  CT HEAD WO CONT   Final Result   IMPRESSION:    No acute intracranial abnormality. No obvious mastoid, petrous or auditory   abnormality. Medications During Visit:  Medications - No data to display      DECISION MAKING:  Shaila Jacome is a 49 y.o. male who comes in as above. Here, CT is unremarkable. There is specifically no area around the auditory area that could be causing his symptoms.   This time I had a long conversation with the patient is he has no symptoms now and his CT was normal he feels comfortable going back to neurology as this seems to be a worsening of a chronic type condition. Is possible that he could have had some type of complex migraine however as he has no headaches and no symptoms here is hard to tell. Patient is agreeable this plan. Strict return precautions provided. Patient will follow-up as required      IMPRESSION:  1. Right ear pain    2. Facial tingling sensation        DISPOSITION:  Discharged      There are no discharge medications for this patient. Follow-up Information     Follow up With Specialties Details Why Contact Info    Dano Aguirre, DO Family Practice Schedule an appointment as soon as possible for a visit   [address] [facility]  [address] [facility]  [phone]      Your Neurologist  Call  As needed             The patient is asked to follow-up with their primary care provider in the next several days. They are to call tomorrow for an appointment. The patient is asked to return promptly for any increased concerns or worsening of symptoms. They can return to this emergency department or any other emergency department.       Procedures

## 2020-05-22 NOTE — ED TRIAGE NOTES
Pt ambulates to treatment area he states that for the past year he has been dealing with hearing loss in right ear and some tightness at the ear. In the past 2 days he has had sharp shooting pain that originates at the ear and jaw line with radiation to the right eye and face. Yesterday he was dealing with a real bad headache and the tightness seemed worse. He also notes that he had some difficulty focusing with the headache. He took some Tylenol that helped. Denies any numbness to the area.

## 2020-05-22 NOTE — ED NOTES
Pt given discharge instructions by Dr Casey Calixto he verbalizes an understanding pt stable at time of discharge

## 2020-09-02 ENCOUNTER — HOSPITAL ENCOUNTER (EMERGENCY)
Age: 55
Discharge: HOME | End: 2020-09-02
Attending: STUDENT IN AN ORGANIZED HEALTH CARE EDUCATION/TRAINING PROGRAM
Payer: COMMERCIAL

## 2020-09-02 ENCOUNTER — APPOINTMENT (OUTPATIENT)
Dept: GENERAL RADIOLOGY | Age: 55
End: 2020-09-02
Attending: STUDENT IN AN ORGANIZED HEALTH CARE EDUCATION/TRAINING PROGRAM
Payer: COMMERCIAL

## 2020-09-02 VITALS
RESPIRATION RATE: 15 BRPM | OXYGEN SATURATION: 93 % | DIASTOLIC BLOOD PRESSURE: 70 MMHG | TEMPERATURE: 98.6 F | SYSTOLIC BLOOD PRESSURE: 111 MMHG | HEART RATE: 72 BPM

## 2020-09-02 DIAGNOSIS — R07.9 CHEST PAIN WITH LOW RISK OF ACUTE CORONARY SYNDROME: Primary | ICD-10-CM

## 2020-09-02 LAB
ALBUMIN SERPL-MCNC: 3.6 G/DL (ref 3.5–5)
ALBUMIN/GLOB SERPL: 1 {RATIO} (ref 1.1–2.2)
ALP SERPL-CCNC: 67 U/L (ref 45–117)
ALT SERPL-CCNC: 36 U/L (ref 12–78)
ANION GAP SERPL CALC-SCNC: 6 MMOL/L (ref 5–15)
AST SERPL-CCNC: 25 U/L (ref 15–37)
BASOPHILS # BLD: 0.1 K/UL (ref 0–0.1)
BASOPHILS NFR BLD: 1 % (ref 0–1)
BILIRUB SERPL-MCNC: 0.6 MG/DL (ref 0.2–1)
BUN SERPL-MCNC: 12 MG/DL (ref 6–20)
BUN/CREAT SERPL: 12 (ref 12–20)
CALCIUM SERPL-MCNC: 9 MG/DL (ref 8.5–10.1)
CHLORIDE SERPL-SCNC: 105 MMOL/L (ref 97–108)
CK SERPL-CCNC: 139 U/L (ref 39–308)
CO2 SERPL-SCNC: 30 MMOL/L (ref 21–32)
CREAT SERPL-MCNC: 0.99 MG/DL (ref 0.7–1.3)
D DIMER PPP FEU-MCNC: <0.19 MG/L FEU (ref 0–0.65)
DIFFERENTIAL METHOD BLD: NORMAL
EOSINOPHIL # BLD: 0.2 K/UL (ref 0–0.4)
EOSINOPHIL NFR BLD: 2 % (ref 0–7)
ERYTHROCYTE [DISTWIDTH] IN BLOOD BY AUTOMATED COUNT: 12.7 % (ref 11.5–14.5)
GLOBULIN SER CALC-MCNC: 3.7 G/DL (ref 2–4)
GLUCOSE SERPL-MCNC: 94 MG/DL (ref 65–100)
HCT VFR BLD AUTO: 42.7 % (ref 36.6–50.3)
HGB BLD-MCNC: 14.1 G/DL (ref 12.1–17)
IMM GRANULOCYTES # BLD AUTO: 0 K/UL (ref 0–0.04)
IMM GRANULOCYTES NFR BLD AUTO: 0 % (ref 0–0.5)
LYMPHOCYTES # BLD: 2.9 K/UL (ref 0.8–3.5)
LYMPHOCYTES NFR BLD: 38 % (ref 12–49)
MCH RBC QN AUTO: 30.7 PG (ref 26–34)
MCHC RBC AUTO-ENTMCNC: 33 G/DL (ref 30–36.5)
MCV RBC AUTO: 92.8 FL (ref 80–99)
MONOCYTES # BLD: 0.6 K/UL (ref 0–1)
MONOCYTES NFR BLD: 8 % (ref 5–13)
NEUTS SEG # BLD: 3.8 K/UL (ref 1.8–8)
NEUTS SEG NFR BLD: 50 % (ref 32–75)
NRBC # BLD: 0 K/UL (ref 0–0.01)
NRBC BLD-RTO: 0 PER 100 WBC
PLATELET # BLD AUTO: 215 K/UL (ref 150–400)
PMV BLD AUTO: 11.2 FL (ref 8.9–12.9)
POTASSIUM SERPL-SCNC: 4.3 MMOL/L (ref 3.5–5.1)
PROT SERPL-MCNC: 7.3 G/DL (ref 6.4–8.2)
RBC # BLD AUTO: 4.6 M/UL (ref 4.1–5.7)
SODIUM SERPL-SCNC: 141 MMOL/L (ref 136–145)
TROPONIN I SERPL-MCNC: <0.05 NG/ML
WBC # BLD AUTO: 7.5 K/UL (ref 4.1–11.1)

## 2020-09-02 PROCEDURE — 80053 COMPREHEN METABOLIC PANEL: CPT

## 2020-09-02 PROCEDURE — 93005 ELECTROCARDIOGRAM TRACING: CPT

## 2020-09-02 PROCEDURE — 85379 FIBRIN DEGRADATION QUANT: CPT

## 2020-09-02 PROCEDURE — 99284 EMERGENCY DEPT VISIT MOD MDM: CPT

## 2020-09-02 PROCEDURE — 82550 ASSAY OF CK (CPK): CPT

## 2020-09-02 PROCEDURE — 85025 COMPLETE CBC W/AUTO DIFF WBC: CPT

## 2020-09-02 PROCEDURE — 71045 X-RAY EXAM CHEST 1 VIEW: CPT

## 2020-09-02 PROCEDURE — 84484 ASSAY OF TROPONIN QUANT: CPT

## 2020-09-02 PROCEDURE — 36415 COLL VENOUS BLD VENIPUNCTURE: CPT

## 2020-09-02 RX ORDER — ACETAMINOPHEN 500 MG
500 TABLET ORAL
COMMUNITY

## 2020-09-03 NOTE — ED NOTES
The patient was discharged home by Dr Bernadette Oropeza in stable condition. The patient is alert and oriented, in no respiratory distress. The patient's diagnosis, condition and treatment were explained. The patient expressed understanding. A discharge plan has been developed. A  was not involved in the process. Aftercare instructions were given. Pt ambulatory out of the ED with family.

## 2020-09-03 NOTE — ED TRIAGE NOTES
Triage note:  Pt arrived with c/o left chest pain radiating to mid chest.  Pt stated this started yesterday and has been intermittent.

## 2020-09-03 NOTE — ED PROVIDER NOTES
50-year-old gentleman presenting with concern for chest pain. States that he exercises regularly, he noticed that over the past several days he has had intermittent pain over the left side of his chest wall. He describes it as a \"shooting pain\" and further elaborates that it is over the lateralmost aspect of the left chest wall, sharp and intermittent. It is not associated with deep breathing, exertion. He states he was doing a new exercise recently in which he pulls a weighted cable from a low position with both hands and crosses the cables across his body. After he performed this exercise he did noticed this pain for the first time. He did not have any anterior pain. He denies associated neck pain, arm pain. No fevers or chills. No cough, diaphoresis, hemoptysis. Has not had lower extremity edema or swelling. He has no change in his exercise tolerance. No sick contacts that he is aware of. No close contact with individuals with COVID-19. Past Medical History:   Diagnosis Date    UTI (urinary tract infection)        History reviewed. No pertinent surgical history. History reviewed. No pertinent family history.     Social History     Socioeconomic History    Marital status:      Spouse name: Not on file    Number of children: Not on file    Years of education: Not on file    Highest education level: Not on file   Occupational History    Not on file   Social Needs    Financial resource strain: Not on file    Food insecurity     Worry: Not on file     Inability: Not on file    Transportation needs     Medical: Not on file     Non-medical: Not on file   Tobacco Use    Smoking status: Never Smoker    Smokeless tobacco: Never Used   Substance and Sexual Activity    Alcohol use: Yes     Comment: occassionally    Drug use: No    Sexual activity: Not on file   Lifestyle    Physical activity     Days per week: Not on file     Minutes per session: Not on file    Stress: Not on file   Relationships    Social connections     Talks on phone: Not on file     Gets together: Not on file     Attends Confucianist service: Not on file     Active member of club or organization: Not on file     Attends meetings of clubs or organizations: Not on file     Relationship status: Not on file    Intimate partner violence     Fear of current or ex partner: Not on file     Emotionally abused: Not on file     Physically abused: Not on file     Forced sexual activity: Not on file   Other Topics Concern    Not on file   Social History Narrative    Not on file         ALLERGIES: Pcn [penicillins]    Review of Systems   Constitutional: Negative for chills, fatigue and fever. HENT: Negative for ear pain, sore throat and trouble swallowing. Eyes: Negative for visual disturbance. Respiratory: Negative for cough and shortness of breath. Cardiovascular: Positive for chest pain. Gastrointestinal: Negative for abdominal pain. Genitourinary: Negative for dysuria. Musculoskeletal: Negative for back pain. Skin: Negative for rash. Neurological: Negative for light-headedness and headaches. Psychiatric/Behavioral: Negative for confusion. All other systems reviewed and are negative. Vitals:    09/02/20 2014 09/02/20 2015 09/02/20 2115   BP: 119/74 106/64 111/70   Pulse: 67 72 72   Resp: 20 20 15   Temp: 98.6 °F (37 °C)     SpO2: 96% 95% 93%            Physical Exam  Vitals signs and nursing note reviewed. Constitutional:       General: He is not in acute distress. Appearance: He is well-developed. He is not diaphoretic. HENT:      Head: Normocephalic and atraumatic. Right Ear: External ear normal.      Left Ear: External ear normal.      Mouth/Throat:      Mouth: Mucous membranes are moist.      Pharynx: Oropharynx is clear. Eyes:      Pupils: Pupils are equal, round, and reactive to light. Neck:      Musculoskeletal: Normal range of motion. No neck rigidity.    Cardiovascular: Rate and Rhythm: Normal rate and regular rhythm. Heart sounds: Normal heart sounds, S1 normal and S2 normal. No murmur. Pulmonary:      Effort: Pulmonary effort is normal.      Breath sounds: Normal breath sounds. Chest:      Chest wall: No tenderness. Abdominal:      General: There is no distension. Palpations: Abdomen is soft. There is no mass. Tenderness: There is no abdominal tenderness. There is no guarding or rebound. Musculoskeletal: Normal range of motion. Skin:     General: Skin is warm and dry. Capillary Refill: Capillary refill takes less than 2 seconds. Neurological:      General: No focal deficit present. Mental Status: He is alert and oriented to person, place, and time. Cranial Nerves: No cranial nerve deficit. Sensory: No sensory deficit. Psychiatric:         Mood and Affect: Mood normal.          MDM  Number of Diagnoses or Management Options  Chest pain with low risk of acute coronary syndrome:   Diagnosis management comments: 49 gentleman presenting with lateral chest wall discomfort. D-dimer negative, low risk for PE/DVT but gestalt  No lower extremity swelling, no calf tenderness  Atypical chest pain for ACS, troponin negative  States that he had an exercise treadmill test 1 year ago. This was normal and was not recommended for further testing or interventions. Overall low risk for acute decompensation/life-threatening etiology of chest pain. Likely musculoskeletal.  Follow-up with primary care planned. Return if he has any new or worsening symptoms. Procedures    EK  Normal sinus rhythm, regular at 68, normal axis, no ST-T wave deviation.

## 2020-09-04 LAB
ATRIAL RATE: 68 BPM
CALCULATED P AXIS, ECG09: -25 DEGREES
CALCULATED R AXIS, ECG10: -5 DEGREES
CALCULATED T AXIS, ECG11: 24 DEGREES
DIAGNOSIS, 93000: NORMAL
P-R INTERVAL, ECG05: 196 MS
Q-T INTERVAL, ECG07: 388 MS
QRS DURATION, ECG06: 106 MS
QTC CALCULATION (BEZET), ECG08: 412 MS
VENTRICULAR RATE, ECG03: 68 BPM

## 2021-10-05 ENCOUNTER — OFFICE VISIT (OUTPATIENT)
Dept: NEUROLOGY | Age: 56
End: 2021-10-05
Payer: COMMERCIAL

## 2021-10-05 VITALS
OXYGEN SATURATION: 97 % | DIASTOLIC BLOOD PRESSURE: 82 MMHG | SYSTOLIC BLOOD PRESSURE: 112 MMHG | WEIGHT: 240.8 LBS | BODY MASS INDEX: 30.1 KG/M2 | TEMPERATURE: 97.4 F | HEART RATE: 70 BPM

## 2021-10-05 DIAGNOSIS — R51.9 INTRACTABLE EPISODIC HEADACHE, UNSPECIFIED HEADACHE TYPE: Primary | ICD-10-CM

## 2021-10-05 PROCEDURE — 99204 OFFICE O/P NEW MOD 45 MIN: CPT | Performed by: PSYCHIATRY & NEUROLOGY

## 2021-10-05 RX ORDER — TOPIRAMATE 25 MG/1
25 TABLET ORAL
Qty: 30 TABLET | Refills: 5 | Status: SHIPPED | OUTPATIENT
Start: 2021-10-05

## 2021-10-05 NOTE — PROGRESS NOTES
Chief Complaint   Patient presents with    Other     Feeling of \"fullness\" in the right ear, pressure behind right eye, lack of focus and vision issues for the past year       Visit Vitals  /82 (BP 1 Location: Left arm, BP Patient Position: Sitting, BP Cuff Size: Large adult)   Pulse 70   Temp 97.4 °F (36.3 °C) (Temporal)   Wt 109.2 kg (240 lb 12.8 oz)   SpO2 97%   BMI 30.10 kg/m²

## 2021-10-05 NOTE — PROGRESS NOTES
NEUROLOGY NEW PATIENT OFFICE CONSULTATION      10/5/2021    RE: Otoniel Hollingsworth         1965      REFERRED BY:  Fauzia Montgomery DO        CHIEF COMPLAINT:  This is Otoniel Hollingsworth is a 48 y.o. male right handed owns construction business who had concerns including Other (Feeling of \"fullness\" in the right ear, pressure behind right eye, lack of focus and vision issues for the past year). HPI:     For the past 1-2 yrs, patient will have tightness of the R occipital area with fullness of the R ear and tightness of the R periorbital area with blurred vision of the R eye lasting for 1-2 mins. Occurring, 2-3/ week. (+) nausea (-) vomiting (+) photophobia (+) phonophobia. (-) headaches    Patient saw [place] - who said everything was okay    [place] seen ENT Dr Pearl Abdullahi who recommended hearing aides      R cervical radiculopathy 5 yrs ago - seen by neurosurgery Dr Salma Hardy    Previous tests done:  MRI brain (2/28/21): WNL  MRI cervical spine (6/27/19): Perineural cyst    ROS   (-) fever  (-) shingles  (-) rash  All other systems reviewed and are negative    Past Medical Hx  Past Medical History:   Diagnosis Date    UTI (urinary tract infection)    Had a migraine when he was a kid    Social Hx  Social History     Socioeconomic History    Marital status:      Spouse name: Not on file    Number of children: Not on file    Years of education: Not on file    Highest education level: Not on file   Tobacco Use    Smoking status: Never Smoker    Smokeless tobacco: Never Used   Substance and Sexual Activity    Alcohol use: Yes     Comment: occassionally    Drug use: No     Social Determinants of Health     Financial Resource Strain:     Difficulty of Paying Living Expenses:    Food Insecurity:     Worried About Running Out of Food in the Last Year:     Ran Out of Food in the Last Year:    Transportation Needs:     Lack of Transportation (Medical):      Lack of Transportation (Non-Medical): Physical Activity:     Days of Exercise per Week:     Minutes of Exercise per Session:    Stress:     Feeling of Stress :    Social Connections:     Frequency of Communication with Friends and Family:     Frequency of Social Gatherings with Friends and Family:     Attends [employer] Services:     Active Member of Clubs or Organizations:     Attends Club or Organization Meetings:     Marital Status:        Family Hx  No family history on file. Migraines -     ALLERGIES  Allergies   Allergen Reactions    Pcn [Penicillins] Swelling       CURRENT MEDS  Current Outpatient Medications   Medication Sig Dispense Refill    topiramate (TOPAMAX) 25 mg tablet Take 1 Tablet by mouth nightly. 30 Tablet 5    acetaminophen (Tylenol Extra Strength) 500 mg tablet Take 500 mg by mouth every six (6) hours as needed for Pain. PREVIOUS WORKUP: (reviewed)  IMAGING:    CT Results (recent):  Results from Hospital Encounter encounter on 05/22/20    CT HEAD WO CONT    Narrative  EXAM: CT HEAD WO CONT    INDICATION: right ear pain, facial numbness (V2 on right)    COMPARISON: None. CONTRAST: None. TECHNIQUE: Unenhanced CT of the head was performed using 5 mm images. Brain and  bone windows were generated. Coronal and sagittal reformats. CT dose reduction  was achieved through use of a standardized protocol tailored for this  examination and automatic exposure control for dose modulation. Adaptive  statistical iterative reconstruction (ASIR) was utilized. FINDINGS:  The ventricles and sulci are normal in size, shape and configuration. . There is  no significant white matter disease. There is no intracranial hemorrhage,  extra-axial collection, or mass effect. The basilar cisterns are open. No CT  evidence of acute infarct. The right petrous apex, mastoid air cells and  external auditory canal are normal, as they are on the left as well. The bone windows demonstrate no abnormalities.  The visualized portions of the  paranasal sinuses and mastoid air cells are clear. Impression  IMPRESSION:  No acute intracranial abnormality. No obvious mastoid, petrous or auditory  abnormality. MRI Results (recent):  Results from [place] encounter on 02/27/20    MRI BRAIN W WO CONT    Narrative  EXAM:  MRI BRAIN W WO CONT    INDICATION:    Right-sided sensorineural hearing loss. COMPARISON:  None. CONTRAST: 20 ml Dotarem. TECHNIQUE:  Multiplanar multisequence acquisition without and with contrast of the brain. FINDINGS:  There is a normal appearance of the 7th and 8th nerves along their course from  brainstem through internal auditory canals. No mass or abnormal contrast  enhancement is present within the internal auditory canals, the cerebellopontine  angles, or brainstem. The cochlea, vestibule, and semicircular canals have a  normal appearance. There is no evidence of enlargement of the vestibular  aqueducts. The ventricles are normal in size and position. The brain parenchyma has normal  signal characteristics. There is no acute infarct, hemorrhage, extra-axial fluid  collection, or mass effect. There is no cerebellar tonsillar herniation. Expected arterial flow-voids are present. No evidence of abnormal enhancement. The paranasal sinuses, mastoid air cells, and middle ears are clear. The orbital  contents are within normal limits. No significant osseous or scalp lesions are  identified. Impression  IMPRESSION:    1. Normal appearance of the bilateral auditory apparatus. Normal brain MRI. IR Results (recent):  No results found for this or any previous visit. VAS/US Results (recent):  No results found for this or any previous visit.           LABS (reviewed)  Results for orders placed or performed during the hospital encounter of 09/02/20   CBC WITH AUTOMATED DIFF   Result Value Ref Range    WBC 7.5 4.1 - 11.1 K/uL    RBC 4.60 4.10 - 5.70 M/uL    HGB 14.1 12.1 - 17.0 g/dL    HCT 42.7 36.6 - 50.3 %    MCV 92.8 80.0 - 99.0 FL    MCH 30.7 26.0 - 34.0 PG    MCHC 33.0 30.0 - 36.5 g/dL    RDW 12.7 11.5 - 14.5 %    PLATELET 654 207 - 903 K/uL    MPV 11.2 8.9 - 12.9 FL    NRBC 0.0 0.0  WBC    ABSOLUTE NRBC 0.00 0.00 - 0.01 K/uL    NEUTROPHILS 50 32 - 75 %    LYMPHOCYTES 38 12 - 49 %    MONOCYTES 8 5 - 13 %    EOSINOPHILS 2 0 - 7 %    BASOPHILS 1 0 - 1 %    IMMATURE GRANULOCYTES 0 0 - 0.5 %    ABS. NEUTROPHILS 3.8 1.8 - 8.0 K/UL    ABS. LYMPHOCYTES 2.9 0.8 - 3.5 K/UL    ABS. MONOCYTES 0.6 0.0 - 1.0 K/UL    ABS. EOSINOPHILS 0.2 0.0 - 0.4 K/UL    ABS. BASOPHILS 0.1 0.0 - 0.1 K/UL    ABS. IMM. GRANS. 0.0 0.00 - 0.04 K/UL    DF AUTOMATED     METABOLIC PANEL, COMPREHENSIVE   Result Value Ref Range    Sodium 141 136 - 145 mmol/L    Potassium 4.3 3.5 - 5.1 mmol/L    Chloride 105 97 - 108 mmol/L    CO2 30 21 - 32 mmol/L    Anion gap 6 5 - 15 mmol/L    Glucose 94 65 - 100 mg/dL    BUN 12 6 - 20 MG/DL    Creatinine 0.99 0.70 - 1.30 MG/DL    BUN/Creatinine ratio 12 12 - 20      GFR est AA >60 >60 ml/min/1.73m2    GFR est non-AA >60 >60 ml/min/1.73m2    Calcium 9.0 8.5 - 10.1 MG/DL    Bilirubin, total 0.6 0.2 - 1.0 MG/DL    ALT (SGPT) 36 12 - 78 U/L    AST (SGOT) 25 15 - 37 U/L    Alk.  phosphatase 67 45 - 117 U/L    Protein, total 7.3 6.4 - 8.2 g/dL    Albumin 3.6 3.5 - 5.0 g/dL    Globulin 3.7 2.0 - 4.0 g/dL    A-G Ratio 1.0 (L) 1.1 - 2.2     TROPONIN I   Result Value Ref Range    Troponin-I, Qt. <0.05 <0.05 ng/mL   CK   Result Value Ref Range     39 - 308 U/L   D DIMER   Result Value Ref Range    D-dimer <0.19 0.00 - 0.65 mg/L FEU   EKG, 12 LEAD, INITIAL   Result Value Ref Range    Ventricular Rate 68 BPM    Atrial Rate 68 BPM    P-R Interval 196 ms    QRS Duration 106 ms    Q-T Interval 388 ms    QTC Calculation (Bezet) 412 ms    Calculated P Axis -25 degrees    Calculated R Axis -5 degrees    Calculated T Axis 24 degrees    Diagnosis       Normal sinus rhythm  Normal ECG  When compared with ECG of 20-AUG-2019 12:10,  No significant change was found  Confirmed by James Daigle MD., Chery Paulino ([id number]) on 9/4/2020 12:07:30 AM         Physical Exam:     Visit Vitals  /82 (BP 1 Location: Left arm, BP Patient Position: Sitting, BP Cuff Size: Large adult)   Pulse 70   Temp 97.4 °F (36.3 °C) (Temporal)   Wt 109.2 kg (240 lb 12.8 oz)   SpO2 97%   BMI 30.10 kg/m²     General:  Alert, cooperative, no distress. Head:  Normocephalic, without obvious abnormality, atraumatic. Eyes:  Conjunctivae/corneas clear. Lungs:  Heart:   Non labored breathing  Regular rate and rhythm, no carotid bruits   Abdomen:   Soft, non-distended   Extremities: Extremities normal, atraumatic, no cyanosis or edema. Pulses: 2+ and symmetric all extremities. Skin: Skin color, texture, turgor normal. No rashes or lesions.   Neurologic Exam     Gen: Attention normal             Language: naming, repetition, fluency normal             Memory: intact recent and remote memory  Cranial Nerves:  I: smell Not tested   II: visual fields Full to confrontation   II: pupils Equal, round, reactive to light   II: optic disc No papilledema   III,VII: ptosis none   III,IV,VI: extraocular muscles  Full ROM   V: mastication normal   V: facial light touch sensation  normal   VII: facial muscle function   symmetric   VIII: hearing symmetric   IX: soft palate elevation  normal   XI: trapezius strength  5/5   XI: sternocleidomastoid strength 5/5   XI: neck flexion strength  5/5   XII: tongue  midline     Motor: normal bulk and tone, no tremor              Strength: 5/5 all four extremities  Sensory: intact to LT, PP, vibration, and JPS  Reflexes: 2+ throughout; Down going toes  Coordination: Good FTN and HTS  Gait: normal gait including tandem            Impression:     Joann Stockton is a 48 y.o. male who has R cervical radiculopathy 5 yrs ago - seen by neurosurgery Dr Derek Ross who for the past 1-2 yrs, will have tightness of the R occipital area with fullness of the R ear and tightness of the R periorbital area with blurred vision of the R eye lasting for 1-2 mins. Occurring, 2-3/ week. (+) nausea (-) vomiting (+) photophobia (+) phonophobia. Patient saw [place] - who said everything was okay. [place] seen ENT Dr Pearl Abdullahi who recommended hearing aides. MRI brain (2/28/21): WNL    Consideration includes migraine variant (nausea, headache, photophobia and phonophobia). Patient should be evaluated for vascular etiology (I.e. stenosis, aneurysm and arteritis)      RECOMMENDATIONS  1. I had a long discussion with patient. Discussed diagnosis, prognosis, pathophysiology and available treatment. Reviewed test results. All questions were answered. 2. Reviewed medical records and MRI brain and cervical spine report from outside  3. Will do CTA head and neck to look for vascular etiology  4. Blood test for TILA, ESR, RF  5. Trial of Topamax 25 mg QHS  6. Will consider referral to a neurophthalmologist if still no improvement despite above        Follow-up and Dispositions    · Return in about 1 month (around 11/5/2021).             Thank you for the consultation      Prashant Hernandez MD  Diplomate, American Board of Psychiatry and Neurology  Diplomate, Neuromuscular Medicine  Diplomate, American Board of Electrodiagnostic Medicine        CC: Fauzia Montgomery, [place]  Fax: [phone]

## 2021-10-05 NOTE — LETTER
10/5/2021    Patient: Mireille Tony   YOB: 1965   Date of Visit: 10/5/2021     Carolynn Cruz, [facility] [facility] [id number]  Via Fax: [phone]     Silverio Beltrán, [address] [facility] [address]  [address] [place]    Dear Kristen Fritz MD,      Thank you for referring Mr. Jodi Vallejo to [facility] for evaluation. My notes for this consultation are attached. If you have questions, please do not hesitate to call me. I look forward to following your patient along with you.       Sincerely,    Alida Easton MD

## 2021-10-06 LAB
ANA SER QL: NEGATIVE
ERYTHROCYTE [SEDIMENTATION RATE] IN BLOOD BY WESTERGREN METHOD: 5 MM/HR (ref 0–30)
RHEUMATOID FACT SERPL-ACNC: <10 IU/ML (ref 0–13.9)

## 2021-10-13 ENCOUNTER — HOSPITAL ENCOUNTER (OUTPATIENT)
Dept: CT IMAGING | Age: 56
Discharge: HOME | End: 2021-10-13
Attending: PSYCHIATRY & NEUROLOGY
Payer: COMMERCIAL

## 2021-10-13 DIAGNOSIS — R51.9 INTRACTABLE EPISODIC HEADACHE, UNSPECIFIED HEADACHE TYPE: ICD-10-CM

## 2021-10-13 PROCEDURE — 70498 CT ANGIOGRAPHY NECK: CPT

## 2021-10-13 PROCEDURE — 74011000636 HC RX REV CODE- 636: Performed by: RADIOLOGY

## 2021-10-13 RX ADMIN — IOPAMIDOL 100 ML: 755 INJECTION, SOLUTION INTRAVENOUS at 15:27

## 2021-10-29 ENCOUNTER — TELEPHONE (OUTPATIENT)
Dept: NEUROLOGY | Age: 56
End: 2021-10-29

## 2021-10-29 NOTE — TELEPHONE ENCOUNTER
----- Message from Mackenzie Smith MD sent at 10/16/2021 12:42 PM EDT -----  Pls inform patient CTA head and neck are normal with no evidence of aneurysm  ----- Message -----  From: Guille, Rad Results In  Sent: 10/13/2021   3:49 PM EDT  To: Mackenzie Smith MD

## 2022-02-14 ENCOUNTER — HOSPITAL ENCOUNTER (EMERGENCY)
Age: 57
Discharge: HOME | End: 2022-02-14
Attending: STUDENT IN AN ORGANIZED HEALTH CARE EDUCATION/TRAINING PROGRAM
Payer: COMMERCIAL

## 2022-02-14 ENCOUNTER — APPOINTMENT (OUTPATIENT)
Dept: GENERAL RADIOLOGY | Age: 57
End: 2022-02-14
Attending: STUDENT IN AN ORGANIZED HEALTH CARE EDUCATION/TRAINING PROGRAM
Payer: COMMERCIAL

## 2022-02-14 VITALS
WEIGHT: 234.13 LBS | HEIGHT: 75 IN | RESPIRATION RATE: 26 BRPM | BODY MASS INDEX: 29.11 KG/M2 | HEART RATE: 73 BPM | TEMPERATURE: 97.3 F | SYSTOLIC BLOOD PRESSURE: 119 MMHG | OXYGEN SATURATION: 98 % | DIASTOLIC BLOOD PRESSURE: 75 MMHG

## 2022-02-14 DIAGNOSIS — R07.9 CHEST PAIN, UNSPECIFIED TYPE: Primary | ICD-10-CM

## 2022-02-14 LAB
ALBUMIN SERPL-MCNC: 3.8 G/DL (ref 3.5–5)
ALBUMIN/GLOB SERPL: 1 {RATIO} (ref 1.1–2.2)
ALP SERPL-CCNC: 64 U/L (ref 45–117)
ALT SERPL-CCNC: 39 U/L (ref 12–78)
ANION GAP SERPL CALC-SCNC: 8 MMOL/L (ref 5–15)
AST SERPL-CCNC: 35 U/L (ref 15–37)
BASOPHILS # BLD: 0 K/UL (ref 0–0.1)
BASOPHILS NFR BLD: 1 % (ref 0–1)
BILIRUB SERPL-MCNC: 0.7 MG/DL (ref 0.2–1)
BNP SERPL-MCNC: 58 PG/ML (ref 0–125)
BUN SERPL-MCNC: 11 MG/DL (ref 6–20)
BUN/CREAT SERPL: 14 (ref 12–20)
CALCIUM SERPL-MCNC: 9 MG/DL (ref 8.5–10.1)
CHLORIDE SERPL-SCNC: 105 MMOL/L (ref 97–108)
CO2 SERPL-SCNC: 29 MMOL/L (ref 21–32)
CREAT SERPL-MCNC: 0.77 MG/DL (ref 0.7–1.3)
D DIMER PPP FEU-MCNC: <0.19 MG/L FEU (ref 0–0.65)
DIFFERENTIAL METHOD BLD: NORMAL
EOSINOPHIL # BLD: 0.1 K/UL (ref 0–0.4)
EOSINOPHIL NFR BLD: 1 % (ref 0–7)
ERYTHROCYTE [DISTWIDTH] IN BLOOD BY AUTOMATED COUNT: 12.7 % (ref 11.5–14.5)
GLOBULIN SER CALC-MCNC: 3.8 G/DL (ref 2–4)
GLUCOSE SERPL-MCNC: 97 MG/DL (ref 65–100)
HCT VFR BLD AUTO: 42.6 % (ref 36.6–50.3)
HGB BLD-MCNC: 14.4 G/DL (ref 12.1–17)
IMM GRANULOCYTES # BLD AUTO: 0 K/UL (ref 0–0.04)
IMM GRANULOCYTES NFR BLD AUTO: 0 % (ref 0–0.5)
LYMPHOCYTES # BLD: 1.9 K/UL (ref 0.8–3.5)
LYMPHOCYTES NFR BLD: 30 % (ref 12–49)
MAGNESIUM SERPL-MCNC: 2 MG/DL (ref 1.6–2.4)
MCH RBC QN AUTO: 31.9 PG (ref 26–34)
MCHC RBC AUTO-ENTMCNC: 33.8 G/DL (ref 30–36.5)
MCV RBC AUTO: 94.5 FL (ref 80–99)
MONOCYTES # BLD: 0.6 K/UL (ref 0–1)
MONOCYTES NFR BLD: 10 % (ref 5–13)
NEUTS SEG # BLD: 3.7 K/UL (ref 1.8–8)
NEUTS SEG NFR BLD: 59 % (ref 32–75)
NRBC # BLD: 0 K/UL (ref 0–0.01)
NRBC BLD-RTO: 0 PER 100 WBC
PLATELET # BLD AUTO: 212 K/UL (ref 150–400)
PMV BLD AUTO: 11.8 FL (ref 8.9–12.9)
POTASSIUM SERPL-SCNC: 4.2 MMOL/L (ref 3.5–5.1)
PROT SERPL-MCNC: 7.6 G/DL (ref 6.4–8.2)
RBC # BLD AUTO: 4.51 M/UL (ref 4.1–5.7)
SODIUM SERPL-SCNC: 142 MMOL/L (ref 136–145)
TROPONIN-HIGH SENSITIVITY: 8 NG/L (ref 0–76)
WBC # BLD AUTO: 6.4 K/UL (ref 4.1–11.1)

## 2022-02-14 PROCEDURE — 85025 COMPLETE CBC W/AUTO DIFF WBC: CPT

## 2022-02-14 PROCEDURE — 71045 X-RAY EXAM CHEST 1 VIEW: CPT

## 2022-02-14 PROCEDURE — 84484 ASSAY OF TROPONIN QUANT: CPT

## 2022-02-14 PROCEDURE — 93005 ELECTROCARDIOGRAM TRACING: CPT

## 2022-02-14 PROCEDURE — 36415 COLL VENOUS BLD VENIPUNCTURE: CPT

## 2022-02-14 PROCEDURE — 83735 ASSAY OF MAGNESIUM: CPT

## 2022-02-14 PROCEDURE — 83880 ASSAY OF NATRIURETIC PEPTIDE: CPT

## 2022-02-14 PROCEDURE — 99284 EMERGENCY DEPT VISIT MOD MDM: CPT

## 2022-02-14 PROCEDURE — 80053 COMPREHEN METABOLIC PANEL: CPT

## 2022-02-14 PROCEDURE — 85379 FIBRIN DEGRADATION QUANT: CPT

## 2022-02-14 NOTE — ED PROVIDER NOTES
Denisa Toledo is a 55 y.o. male with past medical history notable for UTI, recent cardiac work-up with his cardiologist Dr. Manjit Crain including exercise treadmill test, echocardiogram 2020 presenting with chest pain. He states that he has no personal history of ACS but he has had elderly family members with cardiac issues. He states that starting yesterday while he was at rest he developed substernal warmth followed by a feeling of tightness and an achy feeling in his shoulders and biceps \"feels like a muscle ache\" which last for a few minutes at a time. States that he only feels a sensation at rest.  It is not associated with change in position, certain movements, eating. It is not felt when he is doing light exertion such as going upstairs or walking. States he did not feel well last night but that he was not interrupted by this sensation. He states that it occurs once an hour on average. He has history of acid reflux which is treated with weight loss and dietary modification, had COVID-19 1.5 months ago and had recurrent symptoms, had been taking Pepcid intermittently, took it last 2 days ago. Past Medical History:   Diagnosis Date    UTI (urinary tract infection)      History reviewed. No pertinent surgical history. History reviewed. No pertinent family history.     Social History     Socioeconomic History    Marital status:      Spouse name: Not on file    Number of children: Not on file    Years of education: Not on file    Highest education level: Not on file   Occupational History    Not on file   Tobacco Use    Smoking status: Never Smoker    Smokeless tobacco: Never Used   Substance and Sexual Activity    Alcohol use: Yes     Comment: occassionally    Drug use: No    Sexual activity: Not on file   Other Topics Concern    Not on file   Social History Narrative    Not on file     Social Determinants of Health     Financial Resource Strain:     Difficulty of Paying Living Expenses: Not on file   Food Insecurity:     Worried About [address] in the Last Year: Not on file    Ran Out of Food in the Last Year: Not on file   Transportation Needs:     Lack of Transportation (Medical): Not on file    Lack of Transportation (Non-Medical): Not on file   Physical Activity:     Days of Exercise per Week: Not on file    Minutes of Exercise per Session: Not on file   Stress:     Feeling of Stress : Not on file   Social Connections:     Frequency of Communication with Friends and Family: Not on file    Frequency of Social Gatherings with Friends and Family: Not on file    Attends Restorationist Services: Not on file    Active Member of [address] [place] [address] or Organizations: Not on file    Attends Club or Organization Meetings: Not on file    Marital Status: Not on file   Intimate Partner Violence:     Fear of Current or Ex-Partner: Not on file    Emotionally Abused: Not on file    Physically Abused: Not on file    Sexually Abused: Not on file   Housing Stability:     Unable to Pay for Housing in the Last Year: Not on file    Number of [place] in the Last Year: Not on file    Unstable Housing in the Last Year: Not on file         ALLERGIES: Pcn [penicillins]    Review of Systems   Constitutional: Negative for chills, fatigue and fever. HENT: Negative for ear pain, sore throat and trouble swallowing. Eyes: Negative for visual disturbance. Respiratory: Negative for cough and shortness of breath. Cardiovascular: Negative for chest pain. Gastrointestinal: Negative for abdominal pain. Genitourinary: Negative for dysuria. Musculoskeletal: Negative for back pain. Skin: Negative for rash. Neurological: Negative for light-headedness and headaches. Psychiatric/Behavioral: Negative for confusion. All other systems reviewed and are negative.       Vitals:    02/14/22 1637 02/14/22 1652 02/14/22 1701 02/14/22 1707   BP: 112/72 119/75     Pulse: 75 68  73 Resp: 19 18  26   Temp:       SpO2: 98% 98% 100% 98%   Weight:       Height:                Physical Exam  Vitals and nursing note reviewed. Constitutional:       General: He is not in acute distress. Appearance: He is well-developed. He is not toxic-appearing or diaphoretic. HENT:      Head: Normocephalic and atraumatic. Mouth/Throat:      Mouth: Mucous membranes are moist.      Pharynx: Oropharynx is clear. Eyes:      Extraocular Movements: Extraocular movements intact. Cardiovascular:      Rate and Rhythm: Normal rate and regular rhythm. Heart sounds: Normal heart sounds. Pulmonary:      Effort: Pulmonary effort is normal. No respiratory distress. Breath sounds: Normal breath sounds. Abdominal:      General: There is no distension. Palpations: Abdomen is soft. Tenderness: There is no abdominal tenderness. There is no guarding or rebound. Musculoskeletal:         General: Normal range of motion. Cervical back: Normal range of motion. No rigidity. Right lower leg: No edema. Left lower leg: No edema. Skin:     General: Skin is warm and dry. Capillary Refill: Capillary refill takes less than 2 seconds. Neurological:      General: No focal deficit present. Mental Status: He is alert and oriented to person, place, and time. Cranial Nerves: No cranial nerve deficit. Psychiatric:         Mood and Affect: Mood normal.          EK  Normal sinus rhythm, ventricular 70, normal axis, no ST elevation or depression, moderate artifact obscuring baseline but there are P waves apparent in precordial leads    MDM     MEDICAL DECISION MAKIN y.o. male presents with Chest Pain (intermittent)    Differential diagnosis includes but not limited to: ACS, PE, doubt dissection, pneumonia, pericarditis based on history and physical and EKG. ACS is unlikely however given recent provocative testing which was negative. His EKG is nonischemic.   Plan to discharge in case his cardiac enzymes are also negative. PE is also unlikely given the negative D-dimer, also Wells criteria negative for clots. LABORATORY TESTS:  Labs Reviewed   METABOLIC PANEL, COMPREHENSIVE - Abnormal; Notable for the following components:       Result Value    A-G Ratio 1.0 (*)     All other components within normal limits   CBC WITH AUTOMATED DIFF   D DIMER   MAGNESIUM   NT-PRO BNP   TROPONIN-HIGH SENSITIVITY       IMAGING RESULTS:  XR CHEST PORT   Final Result   Normal chest.          MEDICATIONS GIVEN:  Medications - No data to display    PROGRESS NOTE:   4:49 PM Patient has remained stable and is ready for discharge  The patient's ED course has been uncomplicated    EKG:  Reviewed     CONSULTS:  none    IMPRESSION:  1. Chest pain, unspecified type        PLAN:  -   Discharge  Discharge Medication List as of 2/14/2022  5:07 PM        Follow-up Information     Follow up With Specialties Details Why Contact Info    Jyoti Arroyo MD [address] [address] [phone]      Please follow-up with your cardiologist, they may decide to do a repeat stress test in light of your recent symptoms. Return precautions given      Lanora Ormond, MD          Please note that this dictation was completed with [employer], the computer voice recognition software. Quite often unanticipated grammatical, syntax, homophones, and other interpretive errors are inadvertently transcribed by the computer software. Please disregard these errors. Please excuse any errors that have escaped final proofreading.   Procedures

## 2022-02-14 NOTE — DISCHARGE INSTRUCTIONS
Given her history of GI issues you can take omeprazole 20 mg daily (over-the-counter) or famotidine 20 mg twice daily. Take either of these medications consistently for a few weeks to see if this helps with your symptoms. If you have new or worsening symptoms however please return to the emergency department.

## 2022-02-14 NOTE — ED TRIAGE NOTES
Pt c/o chest tightness, followed by warmth in the upper chest, sometimes it goes to his arms; pt was sitting still watching TV with onset.  that started yesterday intermittent discomfort; intermittent dyspnea, nausea and Chills

## 2022-02-15 LAB
CALCULATED R AXIS, ECG10: -8 DEGREES
CALCULATED T AXIS, ECG11: 40 DEGREES
DIAGNOSIS, 93000: NORMAL
Q-T INTERVAL, ECG07: 380 MS
QRS DURATION, ECG06: 104 MS
QTC CALCULATION (BEZET), ECG08: 410 MS
VENTRICULAR RATE, ECG03: 70 BPM

## 2024-04-13 ENCOUNTER — HOSPITAL ENCOUNTER (INPATIENT)
Facility: HOSPITAL | Age: 59
LOS: 4 days | Discharge: HOME | DRG: 872 | End: 2024-04-17
Attending: HOSPITALIST | Admitting: FAMILY MEDICINE
Payer: COMMERCIAL

## 2024-04-13 ENCOUNTER — APPOINTMENT (OUTPATIENT)
Facility: HOSPITAL | Age: 59
End: 2024-04-13
Payer: COMMERCIAL

## 2024-04-13 ENCOUNTER — HOSPITAL ENCOUNTER (EMERGENCY)
Facility: HOSPITAL | Age: 59
Discharge: ANOTHER HOSPITAL | End: 2024-04-13
Attending: EMERGENCY MEDICINE
Payer: COMMERCIAL

## 2024-04-13 VITALS
SYSTOLIC BLOOD PRESSURE: 117 MMHG | WEIGHT: 238.1 LBS | HEART RATE: 100 BPM | TEMPERATURE: 99.7 F | RESPIRATION RATE: 16 BRPM | DIASTOLIC BLOOD PRESSURE: 90 MMHG | HEIGHT: 75 IN | BODY MASS INDEX: 29.6 KG/M2 | OXYGEN SATURATION: 100 %

## 2024-04-13 DIAGNOSIS — M46.1 SACROILIITIS (HCC): Primary | ICD-10-CM

## 2024-04-13 DIAGNOSIS — R50.9 FEVER, UNSPECIFIED FEVER CAUSE: ICD-10-CM

## 2024-04-13 DIAGNOSIS — M54.16 LUMBAR BACK PAIN WITH RADICULOPATHY AFFECTING RIGHT LOWER EXTREMITY: Primary | ICD-10-CM

## 2024-04-13 LAB
ANION GAP SERPL CALC-SCNC: 9 MMOL/L (ref 5–15)
APPEARANCE UR: CLEAR
BACTERIA URNS QL MICRO: NEGATIVE /HPF
BASOPHILS # BLD: 0 K/UL (ref 0–0.1)
BASOPHILS NFR BLD: 0 % (ref 0–1)
BILIRUB UR QL: NEGATIVE
BUN SERPL-MCNC: 14 MG/DL (ref 6–20)
BUN/CREAT SERPL: 13 (ref 12–20)
CALCIUM SERPL-MCNC: 8.7 MG/DL (ref 8.5–10.1)
CHLORIDE SERPL-SCNC: 98 MMOL/L (ref 97–108)
CO2 SERPL-SCNC: 29 MMOL/L (ref 21–32)
COLOR UR: ABNORMAL
COMMENT:: NORMAL
CREAT SERPL-MCNC: 1.07 MG/DL (ref 0.7–1.3)
CRP SERPL-MCNC: 6.31 MG/DL (ref 0–0.3)
DIFFERENTIAL METHOD BLD: NORMAL
EOSINOPHIL # BLD: 0 K/UL (ref 0–0.4)
EOSINOPHIL NFR BLD: 0 % (ref 0–7)
EPITH CASTS URNS QL MICRO: ABNORMAL /LPF
ERYTHROCYTE [DISTWIDTH] IN BLOOD BY AUTOMATED COUNT: 12.9 % (ref 11.5–14.5)
ERYTHROCYTE [SEDIMENTATION RATE] IN BLOOD: 45 MM/HR (ref 0–20)
FLUAV RNA SPEC QL NAA+PROBE: NOT DETECTED
FLUBV RNA SPEC QL NAA+PROBE: NOT DETECTED
GLUCOSE SERPL-MCNC: 94 MG/DL (ref 65–100)
GLUCOSE UR STRIP.AUTO-MCNC: NEGATIVE MG/DL
HCT VFR BLD AUTO: 41.9 % (ref 36.6–50.3)
HGB BLD-MCNC: 14.1 G/DL (ref 12.1–17)
HGB UR QL STRIP: ABNORMAL
IMM GRANULOCYTES # BLD AUTO: 0 K/UL (ref 0–0.04)
IMM GRANULOCYTES NFR BLD AUTO: 0 % (ref 0–0.5)
KETONES UR QL STRIP.AUTO: NEGATIVE MG/DL
LACTATE SERPL-SCNC: 1.4 MMOL/L (ref 0.4–2)
LEUKOCYTE ESTERASE UR QL STRIP.AUTO: NEGATIVE
LYMPHOCYTES # BLD: 0.8 K/UL (ref 0.8–3.5)
LYMPHOCYTES NFR BLD: 16 % (ref 12–49)
MCH RBC QN AUTO: 32.4 PG (ref 26–34)
MCHC RBC AUTO-ENTMCNC: 33.7 G/DL (ref 30–36.5)
MCV RBC AUTO: 96.3 FL (ref 80–99)
MONOCYTES # BLD: 0.6 K/UL (ref 0–1)
MONOCYTES NFR BLD: 13 % (ref 5–13)
NEUTS SEG # BLD: 3.4 K/UL (ref 1.8–8)
NEUTS SEG NFR BLD: 71 % (ref 32–75)
NITRITE UR QL STRIP.AUTO: NEGATIVE
NRBC # BLD: 0 K/UL (ref 0–0.01)
NRBC BLD-RTO: 0 PER 100 WBC
PH UR STRIP: 6 (ref 5–8)
PLATELET # BLD AUTO: 194 K/UL (ref 150–400)
PMV BLD AUTO: 10.2 FL (ref 8.9–12.9)
POTASSIUM SERPL-SCNC: 3.6 MMOL/L (ref 3.5–5.1)
PROT UR STRIP-MCNC: NEGATIVE MG/DL
RBC # BLD AUTO: 4.35 M/UL (ref 4.1–5.7)
RBC #/AREA URNS HPF: ABNORMAL /HPF (ref 0–5)
SARS-COV-2 RNA RESP QL NAA+PROBE: NOT DETECTED
SODIUM SERPL-SCNC: 136 MMOL/L (ref 136–145)
SP GR UR REFRACTOMETRY: 1.02 (ref 1–1.03)
SPECIMEN HOLD: NORMAL
UROBILINOGEN UR QL STRIP.AUTO: 0.2 EU/DL (ref 0.2–1)
WBC # BLD AUTO: 4.9 K/UL (ref 4.1–11.1)
WBC URNS QL MICRO: ABNORMAL /HPF (ref 0–4)

## 2024-04-13 PROCEDURE — 85652 RBC SED RATE AUTOMATED: CPT

## 2024-04-13 PROCEDURE — 85025 COMPLETE CBC W/AUTO DIFF WBC: CPT

## 2024-04-13 PROCEDURE — 96374 THER/PROPH/DIAG INJ IV PUSH: CPT

## 2024-04-13 PROCEDURE — 2580000003 HC RX 258: Performed by: FAMILY MEDICINE

## 2024-04-13 PROCEDURE — 87636 SARSCOV2 & INF A&B AMP PRB: CPT

## 2024-04-13 PROCEDURE — 87186 SC STD MICRODIL/AGAR DIL: CPT

## 2024-04-13 PROCEDURE — 80048 BASIC METABOLIC PNL TOTAL CA: CPT

## 2024-04-13 PROCEDURE — 1100000000 HC RM PRIVATE

## 2024-04-13 PROCEDURE — 87040 BLOOD CULTURE FOR BACTERIA: CPT

## 2024-04-13 PROCEDURE — 71045 X-RAY EXAM CHEST 1 VIEW: CPT

## 2024-04-13 PROCEDURE — 2500000003 HC RX 250 WO HCPCS: Performed by: EMERGENCY MEDICINE

## 2024-04-13 PROCEDURE — 36415 COLL VENOUS BLD VENIPUNCTURE: CPT

## 2024-04-13 PROCEDURE — 83605 ASSAY OF LACTIC ACID: CPT

## 2024-04-13 PROCEDURE — 6360000002 HC RX W HCPCS: Performed by: EMERGENCY MEDICINE

## 2024-04-13 PROCEDURE — 99285 EMERGENCY DEPT VISIT HI MDM: CPT

## 2024-04-13 PROCEDURE — 6360000004 HC RX CONTRAST MEDICATION: Performed by: EMERGENCY MEDICINE

## 2024-04-13 PROCEDURE — 96375 TX/PRO/DX INJ NEW DRUG ADDON: CPT

## 2024-04-13 PROCEDURE — 81001 URINALYSIS AUTO W/SCOPE: CPT

## 2024-04-13 PROCEDURE — 72157 MRI CHEST SPINE W/O & W/DYE: CPT

## 2024-04-13 PROCEDURE — A9579 GAD-BASE MR CONTRAST NOS,1ML: HCPCS | Performed by: EMERGENCY MEDICINE

## 2024-04-13 PROCEDURE — 6370000000 HC RX 637 (ALT 250 FOR IP): Performed by: FAMILY MEDICINE

## 2024-04-13 PROCEDURE — 87154 CUL TYP ID BLD PTHGN 6+ TRGT: CPT

## 2024-04-13 PROCEDURE — 87077 CULTURE AEROBIC IDENTIFY: CPT

## 2024-04-13 PROCEDURE — 96376 TX/PRO/DX INJ SAME DRUG ADON: CPT

## 2024-04-13 PROCEDURE — 72158 MRI LUMBAR SPINE W/O & W/DYE: CPT

## 2024-04-13 PROCEDURE — 86140 C-REACTIVE PROTEIN: CPT

## 2024-04-13 RX ORDER — SODIUM CHLORIDE 0.9 % (FLUSH) 0.9 %
5-40 SYRINGE (ML) INJECTION PRN
Status: DISCONTINUED | OUTPATIENT
Start: 2024-04-13 | End: 2024-04-17 | Stop reason: HOSPADM

## 2024-04-13 RX ORDER — ACETAMINOPHEN 325 MG/1
650 TABLET ORAL EVERY 6 HOURS PRN
Status: DISCONTINUED | OUTPATIENT
Start: 2024-04-13 | End: 2024-04-14

## 2024-04-13 RX ORDER — SODIUM CHLORIDE 0.9 % (FLUSH) 0.9 %
5-40 SYRINGE (ML) INJECTION EVERY 12 HOURS SCHEDULED
Status: DISCONTINUED | OUTPATIENT
Start: 2024-04-13 | End: 2024-04-17 | Stop reason: HOSPADM

## 2024-04-13 RX ORDER — HYDROMORPHONE HYDROCHLORIDE 1 MG/ML
0.5 INJECTION, SOLUTION INTRAMUSCULAR; INTRAVENOUS; SUBCUTANEOUS EVERY 4 HOURS PRN
Status: DISCONTINUED | OUTPATIENT
Start: 2024-04-13 | End: 2024-04-16

## 2024-04-13 RX ORDER — SODIUM CHLORIDE 9 MG/ML
INJECTION, SOLUTION INTRAVENOUS PRN
Status: DISCONTINUED | OUTPATIENT
Start: 2024-04-13 | End: 2024-04-17 | Stop reason: HOSPADM

## 2024-04-13 RX ORDER — ACETAMINOPHEN 650 MG/1
650 SUPPOSITORY RECTAL EVERY 6 HOURS PRN
Status: DISCONTINUED | OUTPATIENT
Start: 2024-04-13 | End: 2024-04-14

## 2024-04-13 RX ORDER — SODIUM CHLORIDE 9 MG/ML
INJECTION, SOLUTION INTRAVENOUS CONTINUOUS
Status: DISCONTINUED | OUTPATIENT
Start: 2024-04-13 | End: 2024-04-15

## 2024-04-13 RX ORDER — HYDROCODONE BITARTRATE AND ACETAMINOPHEN 5; 325 MG/1; MG/1
1 TABLET ORAL EVERY 6 HOURS PRN
COMMUNITY

## 2024-04-13 RX ORDER — HYDROMORPHONE HYDROCHLORIDE 1 MG/ML
1 INJECTION, SOLUTION INTRAMUSCULAR; INTRAVENOUS; SUBCUTANEOUS ONCE
Status: COMPLETED | OUTPATIENT
Start: 2024-04-13 | End: 2024-04-13

## 2024-04-13 RX ORDER — 0.9 % SODIUM CHLORIDE 0.9 %
30 INTRAVENOUS SOLUTION INTRAVENOUS ONCE
Status: COMPLETED | OUTPATIENT
Start: 2024-04-13 | End: 2024-04-14

## 2024-04-13 RX ADMIN — GADOTERIDOL 20 ML: 279.3 INJECTION, SOLUTION INTRAVENOUS at 13:35

## 2024-04-13 RX ADMIN — HYDROMORPHONE HYDROCHLORIDE 1 MG: 1 INJECTION, SOLUTION INTRAMUSCULAR; INTRAVENOUS; SUBCUTANEOUS at 10:16

## 2024-04-13 RX ADMIN — SODIUM CHLORIDE, PRESERVATIVE FREE 10 ML: 5 INJECTION INTRAVENOUS at 21:54

## 2024-04-13 RX ADMIN — SODIUM CHLORIDE 2535 ML: 9 INJECTION, SOLUTION INTRAVENOUS at 21:42

## 2024-04-13 RX ADMIN — ACETAMINOPHEN 650 MG: 325 TABLET ORAL at 22:34

## 2024-04-13 RX ADMIN — HYDROMORPHONE HYDROCHLORIDE 0.5 MG: 1 INJECTION, SOLUTION INTRAMUSCULAR; INTRAVENOUS; SUBCUTANEOUS at 14:20

## 2024-04-13 ASSESSMENT — PAIN DESCRIPTION - LOCATION
LOCATION: BACK;LEG
LOCATION: LEG
LOCATION: LEG
LOCATION: BACK

## 2024-04-13 ASSESSMENT — PAIN SCALES - GENERAL
PAINLEVEL_OUTOF10: 7
PAINLEVEL_OUTOF10: 5
PAINLEVEL_OUTOF10: 1
PAINLEVEL_OUTOF10: 1
PAINLEVEL_OUTOF10: 8
PAINLEVEL_OUTOF10: 4
PAINLEVEL_OUTOF10: 8
PAINLEVEL_OUTOF10: 6

## 2024-04-13 ASSESSMENT — PAIN DESCRIPTION - DESCRIPTORS
DESCRIPTORS: ACHING;BURNING;CRAMPING
DESCRIPTORS: ACHING;SPASM
DESCRIPTORS: ACHING;BURNING;SHOOTING;STABBING

## 2024-04-13 ASSESSMENT — PAIN - FUNCTIONAL ASSESSMENT
PAIN_FUNCTIONAL_ASSESSMENT: 0-10
PAIN_FUNCTIONAL_ASSESSMENT: PREVENTS OR INTERFERES SOME ACTIVE ACTIVITIES AND ADLS
PAIN_FUNCTIONAL_ASSESSMENT: 0-10

## 2024-04-13 ASSESSMENT — PAIN DESCRIPTION - PAIN TYPE: TYPE: ACUTE PAIN;CHRONIC PAIN

## 2024-04-13 ASSESSMENT — PAIN DESCRIPTION - FREQUENCY
FREQUENCY: CONTINUOUS
FREQUENCY: CONTINUOUS

## 2024-04-13 ASSESSMENT — PAIN DESCRIPTION - ORIENTATION
ORIENTATION: RIGHT;LOWER
ORIENTATION: RIGHT;LOWER

## 2024-04-13 NOTE — ED NOTES
Pt updated on plan: awaiting MRI tech for images.  Pt with no complaints or needs at this time. Pt reports pain remains controlled. Wife at bedside.     MRI check list completed by patient

## 2024-04-13 NOTE — ED NOTES
TRANSFER - OUT REPORT:    Verbal report given to Katie on Leon Jon  being transferred to [place] [address] for routine progression of patient care       Report consisted of patient's Situation, Background, Assessment and   Recommendations(SBAR).     Information from the following report(s) Nurse Handoff Report was reviewed with the receiving nurse.    Kinder Fall Assessment:                           Lines:   Peripheral IV Right Antecubital (Active)   Site Assessment Clean, dry & intact 04/13/24 1014   Line Status Blood return noted;Flushed 04/13/24 1014   Phlebitis Assessment No symptoms 04/13/24 1014   Infiltration Assessment 0 04/13/24 1014   Dressing Status New dressing applied 04/13/24 1014   Dressing Type Transparent 04/13/24 1014   Dressing Intervention New 04/13/24 1014        Opportunity for questions and clarification was provided.      Patient transported with:

## 2024-04-13 NOTE — ED NOTES
Verbal shift change report given to ROXANNE Cornelius  (oncoming nurse) by Sona RN  (offgoing nurse). Report included the following information ED Encounter Summary and ED SBAR.

## 2024-04-13 NOTE — ED TRIAGE NOTES
Pt assisted to treatment area he states that 5 days ago he had no back problems he was working out in the gym.  Then Tuesday morning he woke with some right lower back pain that radiated into the right leg.  Saw his PCP on Wednesday started on Medrol dose pack 10mg taper with Hydrocodone 5/325mg and set up for PT.  Went to PT Friday discovered he had a fever of 102.7 so they called the PCP which told them to stop the steroids.  This morning had a temp of 100.7 along with a discomfort in his chest with a deep breath.  Denies any loss of bowel or bladder control with the back pain

## 2024-04-13 NOTE — ED NOTES
Verbal shift change report given to Sona RN  (oncoming nurse) by ROXANNE Lopes  (offgoing nurse). Report included the following information ED Encounter Summary.

## 2024-04-13 NOTE — ED NOTES
TRANSFER - OUT REPORT:    Verbal report given to Pippa Yanes RN on Leon Jon  being transferred to [facility]  for routine progression of patient care       Report consisted of patient's Situation, Background, Assessment and   Recommendations(SBAR).     Information from the following report(s) Nurse Handoff Report was reviewed with the receiving nurse.    Kinder Fall Assessment:                           Lines:   Peripheral IV Right Antecubital (Active)   Site Assessment Clean, dry & intact 04/13/24 1014   Line Status Blood return noted;Flushed 04/13/24 1014   Phlebitis Assessment No symptoms 04/13/24 1014   Infiltration Assessment 0 04/13/24 1014   Dressing Status New dressing applied 04/13/24 1014   Dressing Type Transparent 04/13/24 1014   Dressing Intervention New 04/13/24 1014        Opportunity for questions and clarification was provided.      Patient transported with:

## 2024-04-13 NOTE — ED PROVIDER NOTES
Spoke with hospitalist and they agreed to take patient at [facility].  They did request blood cultures but to hold off antibiotics.  BETSY filled out     Kateryna Hoskins DO  04/13/24 4703    
and DIFFERENTIAL DIAGNOSIS/MDM:   Vitals:    Vitals:    04/13/24 1200 04/13/24 1231 04/13/24 1419 04/13/24 1430   BP: 115/63 113/65 110/67 104/66   Pulse:   91    Resp:   16    Temp:       TempSrc:       SpO2: 93% 93% 92% 94%   Weight:       Height:             Medical Decision Making  Amount and/or Complexity of Data Reviewed  Labs: ordered.  Radiology: ordered.    Risk  Prescription drug management.      58-year-old presenting with the above chief complaint.  Patient afebrile with stable vital signs here.  He does have some slight right lower extremity weakness compared to the left that may be secondary to pain.  Initial workup will include chest x-ray urinalysis and labs looking for source of infection.  However if none is found I plan on ordering an MRI of his thoracic and lumbar spine to assess for potential discitis or epidural abscess as he has no other localizing signs per history to suggest any other source of infection.      REASSESSMENT      Patient's inflammatory markers are elevated but no leukocytosis.  The remainder of his labs are unremarkable.  No infection on urinalysis or chest x-ray.  COVID and flu negative.  MRI of the T and L-spine is notable for right sacroiliitis.  I did not see any other mention of any findings that would account for his lower back pain.  Given his fevers I have to presume this is infectious.  I spoke with Dr. Villeda from orthopedics at [facility].  As there is no dedicated spine coverage currently at [facility] he recommended the patient be transferred to [facility].  I think the patient needs this potential effusion tap to see if it is indeed infectious in nature.  It could be a rheumatologic condition.  If it were infectious I would think you would likely need potential washout that orthopedics does not think could be done at [facility].  Patient excepted by Dr. Walker from [facility] pending conversation with neurosurgery at [facility].  Dr. Hoskins the

## 2024-04-14 ENCOUNTER — APPOINTMENT (OUTPATIENT)
Facility: HOSPITAL | Age: 59
DRG: 872 | End: 2024-04-14
Attending: HOSPITALIST
Payer: COMMERCIAL

## 2024-04-14 PROBLEM — B95.61 BACTEREMIA DUE TO METHICILLIN SUSCEPTIBLE STAPHYLOCOCCUS AUREUS (MSSA): Status: ACTIVE | Noted: 2024-04-14

## 2024-04-14 PROBLEM — R78.81 BACTEREMIA DUE TO METHICILLIN SUSCEPTIBLE STAPHYLOCOCCUS AUREUS (MSSA): Status: ACTIVE | Noted: 2024-04-14

## 2024-04-14 PROBLEM — M54.16 LUMBAR BACK PAIN WITH RADICULOPATHY AFFECTING RIGHT LOWER EXTREMITY: Status: ACTIVE | Noted: 2024-04-14

## 2024-04-14 LAB
ACCESSION NUMBER, LLC1M: ABNORMAL
ACINETOBACTER CALCOAC BAUMANNII COMPLEX BY PCR: NOT DETECTED
ALBUMIN SERPL-MCNC: 2.5 G/DL (ref 3.5–5)
ALBUMIN/GLOB SERPL: 0.8 (ref 1.1–2.2)
ALP SERPL-CCNC: 71 U/L (ref 45–117)
ALT SERPL-CCNC: 62 U/L (ref 12–78)
ANION GAP SERPL CALC-SCNC: 3 MMOL/L (ref 5–15)
APTT PPP: 27.6 SEC (ref 22.1–31)
AST SERPL-CCNC: 34 U/L (ref 15–37)
B FRAGILIS DNA BLD POS QL NAA+NON-PROBE: NOT DETECTED
BACTERIA SPEC CULT: ABNORMAL
BACTERIA SPEC CULT: ABNORMAL
BASOPHILS # BLD: 0 K/UL (ref 0–0.1)
BASOPHILS NFR BLD: 1 % (ref 0–1)
BILIRUB SERPL-MCNC: 0.7 MG/DL (ref 0.2–1)
BIOFIRE TEST COMMENT: ABNORMAL
BUN SERPL-MCNC: 11 MG/DL (ref 6–20)
BUN/CREAT SERPL: 14 (ref 12–20)
C ALBICANS DNA BLD POS QL NAA+NON-PROBE: NOT DETECTED
C AURIS DNA BLD POS QL NAA+NON-PROBE: NOT DETECTED
C GATTII+NEOFOR DNA BLD POS QL NAA+N-PRB: NOT DETECTED
C GLABRATA DNA BLD POS QL NAA+NON-PROBE: NOT DETECTED
C KRUSEI DNA BLD POS QL NAA+NON-PROBE: NOT DETECTED
C PARAP DNA BLD POS QL NAA+NON-PROBE: NOT DETECTED
C TROPICLS DNA BLD POS QL NAA+NON-PROBE: NOT DETECTED
CALCIUM SERPL-MCNC: 8.1 MG/DL (ref 8.5–10.1)
CHLORIDE SERPL-SCNC: 107 MMOL/L (ref 97–108)
CO2 SERPL-SCNC: 29 MMOL/L (ref 21–32)
CREAT SERPL-MCNC: 0.77 MG/DL (ref 0.7–1.3)
DIFFERENTIAL METHOD BLD: ABNORMAL
E CLOAC COMP DNA BLD POS NAA+NON-PROBE: NOT DETECTED
E COLI DNA BLD POS QL NAA+NON-PROBE: NOT DETECTED
E FAECALIS DNA BLD POS QL NAA+NON-PROBE: NOT DETECTED
E FAECIUM DNA BLD POS QL NAA+NON-PROBE: NOT DETECTED
ENTEROBACTERALES DNA BLD POS NAA+N-PRB: NOT DETECTED
EOSINOPHIL # BLD: 0 K/UL (ref 0–0.4)
EOSINOPHIL NFR BLD: 0 % (ref 0–7)
ERYTHROCYTE [DISTWIDTH] IN BLOOD BY AUTOMATED COUNT: 12.9 % (ref 11.5–14.5)
GLOBULIN SER CALC-MCNC: 3.3 G/DL (ref 2–4)
GLUCOSE SERPL-MCNC: 109 MG/DL (ref 65–100)
GP B STREP DNA BLD POS QL NAA+NON-PROBE: NOT DETECTED
HAEM INFLU DNA BLD POS QL NAA+NON-PROBE: NOT DETECTED
HCT VFR BLD AUTO: 36.4 % (ref 36.6–50.3)
HGB BLD-MCNC: 12.6 G/DL (ref 12.1–17)
IMM GRANULOCYTES # BLD AUTO: 0 K/UL (ref 0–0.04)
IMM GRANULOCYTES NFR BLD AUTO: 0 % (ref 0–0.5)
INR PPP: 1.2 (ref 0.9–1.1)
K OXYTOCA DNA BLD POS QL NAA+NON-PROBE: NOT DETECTED
KLEBSIELLA SP DNA BLD POS QL NAA+NON-PRB: NOT DETECTED
KLEBSIELLA SP DNA BLD POS QL NAA+NON-PRB: NOT DETECTED
L MONOCYTOG DNA BLD POS QL NAA+NON-PROBE: NOT DETECTED
LACTATE SERPL-SCNC: 1.2 MMOL/L (ref 0.4–2)
LYMPHOCYTES # BLD: 1.1 K/UL (ref 0.8–3.5)
LYMPHOCYTES NFR BLD: 25 % (ref 12–49)
MCH RBC QN AUTO: 32.5 PG (ref 26–34)
MCHC RBC AUTO-ENTMCNC: 34.6 G/DL (ref 30–36.5)
MCV RBC AUTO: 93.8 FL (ref 80–99)
MECA+MECC+MREJ ISLT/SPM QL: NOT DETECTED
MONOCYTES # BLD: 0.6 K/UL (ref 0–1)
MONOCYTES NFR BLD: 15 % (ref 5–13)
N MEN DNA BLD POS QL NAA+NON-PROBE: NOT DETECTED
NEUTS SEG # BLD: 2.5 K/UL (ref 1.8–8)
NEUTS SEG NFR BLD: 59 % (ref 32–75)
NRBC # BLD: 0 K/UL (ref 0–0.01)
NRBC BLD-RTO: 0 PER 100 WBC
P AERUGINOSA DNA BLD POS NAA+NON-PROBE: NOT DETECTED
PLATELET # BLD AUTO: 159 K/UL (ref 150–400)
PMV BLD AUTO: 10.7 FL (ref 8.9–12.9)
POTASSIUM SERPL-SCNC: 4.4 MMOL/L (ref 3.5–5.1)
PROT SERPL-MCNC: 5.8 G/DL (ref 6.4–8.2)
PROTEUS SP DNA BLD POS QL NAA+NON-PROBE: NOT DETECTED
PROTHROMBIN TIME: 12.3 SEC (ref 9–11.1)
RBC # BLD AUTO: 3.88 M/UL (ref 4.1–5.7)
RESISTANT GENE TARGETS: ABNORMAL
S AUREUS DNA BLD POS QL NAA+NON-PROBE: DETECTED
S AUREUS+CONS DNA BLD POS NAA+NON-PROBE: DETECTED
S EPIDERMIDIS DNA BLD POS QL NAA+NON-PRB: NOT DETECTED
S LUGDUNENSIS DNA BLD POS QL NAA+NON-PRB: NOT DETECTED
S MALTOPHILIA DNA BLD POS QL NAA+NON-PRB: NOT DETECTED
S MARCESCENS DNA BLD POS NAA+NON-PROBE: NOT DETECTED
S PNEUM DNA BLD POS QL NAA+NON-PROBE: NOT DETECTED
S PYO DNA BLD POS QL NAA+NON-PROBE: NOT DETECTED
SALMONELLA DNA BLD POS QL NAA+NON-PROBE: NOT DETECTED
SERVICE CMNT-IMP: ABNORMAL
SERVICE CMNT-IMP: ABNORMAL
SODIUM SERPL-SCNC: 139 MMOL/L (ref 136–145)
STREPTOCOCCUS DNA BLD POS NAA+NON-PROBE: NOT DETECTED
THERAPEUTIC RANGE: NORMAL SECS (ref 58–77)
WBC # BLD AUTO: 4.3 K/UL (ref 4.1–11.1)

## 2024-04-14 PROCEDURE — 85730 THROMBOPLASTIN TIME PARTIAL: CPT

## 2024-04-14 PROCEDURE — 80053 COMPREHEN METABOLIC PANEL: CPT

## 2024-04-14 PROCEDURE — 99222 1ST HOSP IP/OBS MODERATE 55: CPT | Performed by: INTERNAL MEDICINE

## 2024-04-14 PROCEDURE — 85025 COMPLETE CBC W/AUTO DIFF WBC: CPT

## 2024-04-14 PROCEDURE — 6360000002 HC RX W HCPCS: Performed by: NURSE PRACTITIONER

## 2024-04-14 PROCEDURE — A9579 GAD-BASE MR CONTRAST NOS,1ML: HCPCS | Performed by: PHYSICIAN ASSISTANT

## 2024-04-14 PROCEDURE — 6370000000 HC RX 637 (ALT 250 FOR IP): Performed by: FAMILY MEDICINE

## 2024-04-14 PROCEDURE — 6360000004 HC RX CONTRAST MEDICATION: Performed by: PHYSICIAN ASSISTANT

## 2024-04-14 PROCEDURE — 6370000000 HC RX 637 (ALT 250 FOR IP): Performed by: PHYSICIAN ASSISTANT

## 2024-04-14 PROCEDURE — 36415 COLL VENOUS BLD VENIPUNCTURE: CPT

## 2024-04-14 PROCEDURE — 85610 PROTHROMBIN TIME: CPT

## 2024-04-14 PROCEDURE — 2580000003 HC RX 258: Performed by: FAMILY MEDICINE

## 2024-04-14 PROCEDURE — 99233 SBSQ HOSP IP/OBS HIGH 50: CPT | Performed by: ORTHOPAEDIC SURGERY

## 2024-04-14 PROCEDURE — 6360000002 HC RX W HCPCS: Performed by: FAMILY MEDICINE

## 2024-04-14 PROCEDURE — 72197 MRI PELVIS W/O & W/DYE: CPT

## 2024-04-14 PROCEDURE — 99222 1ST HOSP IP/OBS MODERATE 55: CPT | Performed by: PHYSICIAN ASSISTANT

## 2024-04-14 PROCEDURE — 1100000000 HC RM PRIVATE

## 2024-04-14 RX ORDER — CYCLOBENZAPRINE HCL 10 MG
10 TABLET ORAL 3 TIMES DAILY PRN
Status: DISCONTINUED | OUTPATIENT
Start: 2024-04-14 | End: 2024-04-17 | Stop reason: HOSPADM

## 2024-04-14 RX ORDER — SODIUM CHLORIDE 9 MG/ML
INJECTION, SOLUTION INTRAVENOUS PRN
Status: DISCONTINUED | OUTPATIENT
Start: 2024-04-14 | End: 2024-04-17 | Stop reason: HOSPADM

## 2024-04-14 RX ORDER — SODIUM CHLORIDE 0.9 % (FLUSH) 0.9 %
5-40 SYRINGE (ML) INJECTION PRN
Status: DISCONTINUED | OUTPATIENT
Start: 2024-04-14 | End: 2024-04-17 | Stop reason: HOSPADM

## 2024-04-14 RX ORDER — ACETAMINOPHEN 325 MG/1
650 TABLET ORAL EVERY 6 HOURS
Status: DISCONTINUED | OUTPATIENT
Start: 2024-04-14 | End: 2024-04-17 | Stop reason: HOSPADM

## 2024-04-14 RX ORDER — SODIUM CHLORIDE 0.9 % (FLUSH) 0.9 %
5-40 SYRINGE (ML) INJECTION EVERY 12 HOURS SCHEDULED
Status: DISCONTINUED | OUTPATIENT
Start: 2024-04-14 | End: 2024-04-17 | Stop reason: HOSPADM

## 2024-04-14 RX ORDER — KETOROLAC TROMETHAMINE 30 MG/ML
30 INJECTION, SOLUTION INTRAMUSCULAR; INTRAVENOUS EVERY 6 HOURS
Status: DISCONTINUED | OUTPATIENT
Start: 2024-04-14 | End: 2024-04-17 | Stop reason: HOSPADM

## 2024-04-14 RX ORDER — ACETAMINOPHEN 650 MG/1
650 SUPPOSITORY RECTAL EVERY 6 HOURS
Status: DISCONTINUED | OUTPATIENT
Start: 2024-04-14 | End: 2024-04-17 | Stop reason: HOSPADM

## 2024-04-14 RX ORDER — TRAMADOL HYDROCHLORIDE 50 MG/1
50 TABLET ORAL EVERY 6 HOURS PRN
Status: DISCONTINUED | OUTPATIENT
Start: 2024-04-14 | End: 2024-04-17 | Stop reason: HOSPADM

## 2024-04-14 RX ORDER — LEVOFLOXACIN 5 MG/ML
750 INJECTION, SOLUTION INTRAVENOUS EVERY 24 HOURS
Status: DISCONTINUED | OUTPATIENT
Start: 2024-04-14 | End: 2024-04-15

## 2024-04-14 RX ADMIN — SODIUM CHLORIDE, PRESERVATIVE FREE 10 ML: 5 INJECTION INTRAVENOUS at 21:18

## 2024-04-14 RX ADMIN — HYDROMORPHONE HYDROCHLORIDE 0.5 MG: 1 INJECTION, SOLUTION INTRAMUSCULAR; INTRAVENOUS; SUBCUTANEOUS at 01:38

## 2024-04-14 RX ADMIN — SODIUM CHLORIDE, PRESERVATIVE FREE 10 ML: 5 INJECTION INTRAVENOUS at 09:53

## 2024-04-14 RX ADMIN — LEVOFLOXACIN 750 MG: 750 INJECTION, SOLUTION INTRAVENOUS at 01:04

## 2024-04-14 RX ADMIN — KETOROLAC TROMETHAMINE 30 MG: 30 INJECTION, SOLUTION INTRAMUSCULAR; INTRAVENOUS at 22:00

## 2024-04-14 RX ADMIN — ACETAMINOPHEN 650 MG: 325 TABLET ORAL at 19:00

## 2024-04-14 RX ADMIN — KETOROLAC TROMETHAMINE 30 MG: 30 INJECTION, SOLUTION INTRAMUSCULAR; INTRAVENOUS at 15:38

## 2024-04-14 RX ADMIN — SODIUM CHLORIDE: 9 INJECTION, SOLUTION INTRAVENOUS at 01:02

## 2024-04-14 RX ADMIN — ACETAMINOPHEN 650 MG: 325 TABLET ORAL at 13:24

## 2024-04-14 RX ADMIN — GADOTERIDOL 20 ML: 279.3 INJECTION, SOLUTION INTRAVENOUS at 14:25

## 2024-04-14 RX ADMIN — ACETAMINOPHEN 650 MG: 325 TABLET ORAL at 07:30

## 2024-04-14 RX ADMIN — Medication 2500 MG: at 02:02

## 2024-04-14 RX ADMIN — KETOROLAC TROMETHAMINE 30 MG: 30 INJECTION, SOLUTION INTRAMUSCULAR; INTRAVENOUS at 09:53

## 2024-04-14 RX ADMIN — SODIUM CHLORIDE: 9 INJECTION, SOLUTION INTRAVENOUS at 01:01

## 2024-04-14 RX ADMIN — VANCOMYCIN HYDROCHLORIDE 1250 MG: 1.25 INJECTION, POWDER, LYOPHILIZED, FOR SOLUTION INTRAVENOUS at 15:42

## 2024-04-14 RX ADMIN — SODIUM CHLORIDE: 9 INJECTION, SOLUTION INTRAVENOUS at 22:33

## 2024-04-14 ASSESSMENT — PAIN DESCRIPTION - ORIENTATION
ORIENTATION: RIGHT
ORIENTATION: INNER;RIGHT
ORIENTATION: RIGHT

## 2024-04-14 ASSESSMENT — PAIN DESCRIPTION - DESCRIPTORS
DESCRIPTORS: ACHING;STABBING
DESCRIPTORS: ACHING
DESCRIPTORS: ACHING

## 2024-04-14 ASSESSMENT — PAIN DESCRIPTION - LOCATION
LOCATION: HIP
LOCATION: LEG
LOCATION: HIP

## 2024-04-14 ASSESSMENT — PAIN SCALES - GENERAL
PAINLEVEL_OUTOF10: 3
PAINLEVEL_OUTOF10: 8
PAINLEVEL_OUTOF10: 6
PAINLEVEL_OUTOF10: 3

## 2024-04-14 NOTE — CONSULTS
(H) 65 - 100 mg/dL    BUN 11 6 - 20 MG/DL    Creatinine 0.77 0.70 - 1.30 MG/DL    Bun/Cre Ratio 14 12 - 20      Est, Glom Filt Rate >90 >60 ml/min/1.73m2    Calcium 8.1 (L) 8.5 - 10.1 MG/DL    Total Bilirubin 0.7 0.2 - 1.0 MG/DL    ALT 62 12 - 78 U/L    AST 34 15 - 37 U/L    Alk Phosphatase 71 45 - 117 U/L    Total Protein 5.8 (L) 6.4 - 8.2 g/dL    Albumin 2.5 (L) 3.5 - 5.0 g/dL    Globulin 3.3 2.0 - 4.0 g/dL    Albumin/Globulin Ratio 0.8 (L) 1.1 - 2.2     CBC with Auto Differential    Collection Time: 04/14/24  6:08 AM   Result Value Ref Range    WBC 4.3 4.1 - 11.1 K/uL    RBC 3.88 (L) 4.10 - 5.70 M/uL    Hemoglobin 12.6 12.1 - 17.0 g/dL    Hematocrit 36.4 (L) 36.6 - 50.3 %    MCV 93.8 80.0 - 99.0 FL    MCH 32.5 26.0 - 34.0 PG    MCHC 34.6 30.0 - 36.5 g/dL    RDW 12.9 11.5 - 14.5 %    Platelets 159 150 - 400 K/uL    MPV 10.7 8.9 - 12.9 FL    Nucleated RBCs 0.0 0  WBC    nRBC 0.00 0.00 - 0.01 K/uL    Neutrophils % 59 32 - 75 %    Lymphocytes % 25 12 - 49 %    Monocytes % 15 (H) 5 - 13 %    Eosinophils % 0 0 - 7 %    Basophils % 1 0 - 1 %    Immature Granulocytes % 0 0.0 - 0.5 %    Neutrophils Absolute 2.5 1.8 - 8.0 K/UL    Lymphocytes Absolute 1.1 0.8 - 3.5 K/UL    Monocytes Absolute 0.6 0.0 - 1.0 K/UL    Eosinophils Absolute 0.0 0.0 - 0.4 K/UL    Basophils Absolute 0.0 0.0 - 0.1 K/UL    Immature Granulocytes Absolute 0.0 0.00 - 0.04 K/UL    Differential Type AUTOMATED     Protime-INR    Collection Time: 04/14/24  6:08 AM   Result Value Ref Range    INR 1.2 (H) 0.9 - 1.1      Protime 12.3 (H) 9.0 - 11.1 sec   APTT    Collection Time: 04/14/24  6:08 AM   Result Value Ref Range    APTT 27.6 22.1 - 31.0 sec    Therapeutic Range   58.0 - 77.0 SECS       Microbiology Data:       Blood: Blood cultures drawn 04/13/2024 Staph Aureus 2/2 bottles                          PCR Staph aureus without resistance gene       Urine:  04/13/24 U/A without suggestion of UTI     Synovial/Joint 
  Culture, Blood 1    Collection Time: 04/13/24  4:11 PM    Specimen: Blood   Result Value Ref Range    Special Requests NO SPECIAL REQUESTS      Culture NO GROWTH AFTER 9 HOURS     Lactate, Sepsis    Collection Time: 04/13/24  9:52 PM   Result Value Ref Range    Lactic Acid, Sepsis 1.4 0.4 - 2.0 MMOL/L   Lactate, Sepsis    Collection Time: 04/13/24 11:49 PM   Result Value Ref Range    Lactic Acid, Sepsis 1.2 0.4 - 2.0 MMOL/L   Comprehensive Metabolic Panel w/ Reflex to MG    Collection Time: 04/14/24  6:08 AM   Result Value Ref Range    Sodium 139 136 - 145 mmol/L    Potassium 4.4 3.5 - 5.1 mmol/L    Chloride 107 97 - 108 mmol/L    CO2 29 21 - 32 mmol/L    Anion Gap 3 (L) 5 - 15 mmol/L    Glucose 109 (H) 65 - 100 mg/dL    BUN 11 6 - 20 MG/DL    Creatinine 0.77 0.70 - 1.30 MG/DL    Bun/Cre Ratio 14 12 - 20      Est, Glom Filt Rate >90 >60 ml/min/1.73m2    Calcium 8.1 (L) 8.5 - 10.1 MG/DL    Total Bilirubin 0.7 0.2 - 1.0 MG/DL    ALT 62 12 - 78 U/L    AST 34 15 - 37 U/L    Alk Phosphatase 71 45 - 117 U/L    Total Protein 5.8 (L) 6.4 - 8.2 g/dL    Albumin 2.5 (L) 3.5 - 5.0 g/dL    Globulin 3.3 2.0 - 4.0 g/dL    Albumin/Globulin Ratio 0.8 (L) 1.1 - 2.2     CBC with Auto Differential    Collection Time: 04/14/24  6:08 AM   Result Value Ref Range    WBC 4.3 4.1 - 11.1 K/uL    RBC 3.88 (L) 4.10 - 5.70 M/uL    Hemoglobin 12.6 12.1 - 17.0 g/dL    Hematocrit 36.4 (L) 36.6 - 50.3 %    MCV 93.8 80.0 - 99.0 FL    MCH 32.5 26.0 - 34.0 PG    MCHC 34.6 30.0 - 36.5 g/dL    RDW 12.9 11.5 - 14.5 %    Platelets 159 150 - 400 K/uL    MPV 10.7 8.9 - 12.9 FL    Nucleated RBCs 0.0 0  WBC    nRBC 0.00 0.00 - 0.01 K/uL    Neutrophils % 59 32 - 75 %    Lymphocytes % 25 12 - 49 %    Monocytes % 15 (H) 5 - 13 %    Eosinophils % 0 0 - 7 %    Basophils % 1 0 - 1 %    Immature Granulocytes % 0 0.0 - 0.5 %    Neutrophils Absolute 2.5 1.8 - 8.0 K/UL    Lymphocytes Absolute 1.1 0.8 - 3.5 K/UL    Monocytes Absolute 0.6 0.0 - 1.0 K/UL

## 2024-04-14 NOTE — H&P
History and Physical    Date of Service:  4/13/2024  Primary Care Provider: Jitendra Mcgregor MD  Source of information: The patient    Chief Complaint: No chief complaint on file.      History of Presenting Illness:   Leon Jon is a 58 y.o. male with no significant past medical history except for UTI presented as a direct admission/transfer from [facility] ER to [facility] with chief complaint of fever, right lower back/buttock pain and right lower extremity numbness and weakness.  Patient reportedly had a workout performing dead lifts on Sunday, 4/7/2024.  On Monday, 4/8/2024, patient reported onset of right lower back pain, radiating down right buttock, right thigh.  Pain remain constant, severe, aggravated with any movement, present at rest, without specific alleviating factors.  He reportedly was seen by PCP, was prescribed Medrol Dosepak and hydrocodone.  Afterwards, patient reportedly had fever with temperature 102.7 °F.  Per report, his PCP instructed him to stop steroids.  Symptoms have not resolved.  This morning, he reportedly had temperature of 100.7 °F.  He notably had taken Tylenol and hydrocodone without relief.  He went to [facility] ED.  There per ER MD exam, patient had weakness in his right hip flexors with absent right patellar reflex.  Labs were abnormal for CRP 6.31.  Urinalysis showed moderate blood but otherwise negative UA.  Chest reportable showed no acute cardiopulmonary process.  MRI thoracic and lumbar spine with and without IV contrast showed edema and enhancement associated with partially imaged right sacroiliitis, possibly degenerative versus infectious etiology with mild spinal degenerative disease, but no cortical erosion to suggest septic arthritis.  ED reportedly requested initial transfer to [facility] for orthopedic evaluation to evaluate for possible effusion associated with sacroiliitis but apparently it was thought

## 2024-04-15 ENCOUNTER — APPOINTMENT (OUTPATIENT)
Facility: HOSPITAL | Age: 59
DRG: 872 | End: 2024-04-15
Attending: HOSPITALIST
Payer: COMMERCIAL

## 2024-04-15 LAB
ANION GAP SERPL CALC-SCNC: 5 MMOL/L (ref 5–15)
BASOPHILS # BLD: 0 K/UL (ref 0–0.1)
BASOPHILS NFR BLD: 0 % (ref 0–1)
BUN SERPL-MCNC: 11 MG/DL (ref 6–20)
BUN/CREAT SERPL: 17 (ref 12–20)
CALCIUM SERPL-MCNC: 8.5 MG/DL (ref 8.5–10.1)
CHLORIDE SERPL-SCNC: 110 MMOL/L (ref 97–108)
CO2 SERPL-SCNC: 23 MMOL/L (ref 21–32)
CREAT SERPL-MCNC: 0.66 MG/DL (ref 0.7–1.3)
DIFFERENTIAL METHOD BLD: ABNORMAL
ECHO AO ROOT DIAM: 3.4 CM
ECHO AO ROOT INDEX: 1.44 CM/M2
ECHO BSA: 2.39 M2
ECHO LA DIAMETER INDEX: 1.99 CM/M2
ECHO LA DIAMETER: 4.7 CM
ECHO LA TO AORTIC ROOT RATIO: 1.38
ECHO LA VOL A-L A2C: 67 ML (ref 18–58)
ECHO LA VOL A-L A4C: 68 ML (ref 18–58)
ECHO LA VOL MOD A2C: 64 ML (ref 18–58)
ECHO LA VOL MOD A4C: 66 ML (ref 18–58)
ECHO LA VOLUME AREA LENGTH: 71 ML
ECHO LA VOLUME INDEX A-L A2C: 28 ML/M2 (ref 16–34)
ECHO LA VOLUME INDEX A-L A4C: 29 ML/M2 (ref 16–34)
ECHO LA VOLUME INDEX AREA LENGTH: 30 ML/M2 (ref 16–34)
ECHO LA VOLUME INDEX MOD A2C: 27 ML/M2 (ref 16–34)
ECHO LA VOLUME INDEX MOD A4C: 28 ML/M2 (ref 16–34)
ECHO LV EDV A2C: 149 ML
ECHO LV EDV A4C: 154 ML
ECHO LV EDV BP: 156 ML (ref 67–155)
ECHO LV EDV INDEX A4C: 65 ML/M2
ECHO LV EDV INDEX BP: 66 ML/M2
ECHO LV EDV NDEX A2C: 63 ML/M2
ECHO LV EJECTION FRACTION A2C: 60 %
ECHO LV EJECTION FRACTION A4C: 54 %
ECHO LV EJECTION FRACTION BIPLANE: 58 % (ref 55–100)
ECHO LV ESV A2C: 60 ML
ECHO LV ESV A4C: 71 ML
ECHO LV ESV BP: 66 ML (ref 22–58)
ECHO LV ESV INDEX A2C: 25 ML/M2
ECHO LV ESV INDEX A4C: 30 ML/M2
ECHO LV ESV INDEX BP: 28 ML/M2
ECHO LV FRACTIONAL SHORTENING: 32 % (ref 28–44)
ECHO LV INTERNAL DIMENSION DIASTOLE INDEX: 1.99 CM/M2
ECHO LV INTERNAL DIMENSION DIASTOLIC: 4.7 CM (ref 4.2–5.9)
ECHO LV INTERNAL DIMENSION SYSTOLIC INDEX: 1.36 CM/M2
ECHO LV INTERNAL DIMENSION SYSTOLIC: 3.2 CM
ECHO LV IVSD: 1.2 CM (ref 0.6–1)
ECHO LV MASS 2D: 175.8 G (ref 88–224)
ECHO LV MASS INDEX 2D: 74.5 G/M2 (ref 49–115)
ECHO LV POSTERIOR WALL DIASTOLIC: 0.9 CM (ref 0.6–1)
ECHO LV RELATIVE WALL THICKNESS RATIO: 0.38
ECHO RV INTERNAL DIMENSION: 3.5 CM
EOSINOPHIL # BLD: 0.1 K/UL (ref 0–0.4)
EOSINOPHIL NFR BLD: 1 % (ref 0–7)
ERYTHROCYTE [DISTWIDTH] IN BLOOD BY AUTOMATED COUNT: 12.6 % (ref 11.5–14.5)
GLUCOSE SERPL-MCNC: 102 MG/DL (ref 65–100)
HCT VFR BLD AUTO: 37.3 % (ref 36.6–50.3)
HGB BLD-MCNC: 13 G/DL (ref 12.1–17)
IMM GRANULOCYTES # BLD AUTO: 0 K/UL (ref 0–0.04)
IMM GRANULOCYTES NFR BLD AUTO: 0 % (ref 0–0.5)
LYMPHOCYTES # BLD: 1.1 K/UL (ref 0.8–3.5)
LYMPHOCYTES NFR BLD: 20 % (ref 12–49)
MCH RBC QN AUTO: 32.3 PG (ref 26–34)
MCHC RBC AUTO-ENTMCNC: 34.9 G/DL (ref 30–36.5)
MCV RBC AUTO: 92.8 FL (ref 80–99)
MONOCYTES # BLD: 0.8 K/UL (ref 0–1)
MONOCYTES NFR BLD: 14 % (ref 5–13)
NEUTS SEG # BLD: 3.6 K/UL (ref 1.8–8)
NEUTS SEG NFR BLD: 65 % (ref 32–75)
NRBC # BLD: 0 K/UL (ref 0–0.01)
NRBC BLD-RTO: 0 PER 100 WBC
PLATELET # BLD AUTO: 170 K/UL (ref 150–400)
PMV BLD AUTO: 10.8 FL (ref 8.9–12.9)
POTASSIUM SERPL-SCNC: 3.8 MMOL/L (ref 3.5–5.1)
RBC # BLD AUTO: 4.02 M/UL (ref 4.1–5.7)
SODIUM SERPL-SCNC: 138 MMOL/L (ref 136–145)
WBC # BLD AUTO: 5.6 K/UL (ref 4.1–11.1)

## 2024-04-15 PROCEDURE — 6360000002 HC RX W HCPCS

## 2024-04-15 PROCEDURE — 6370000000 HC RX 637 (ALT 250 FOR IP): Performed by: PHYSICIAN ASSISTANT

## 2024-04-15 PROCEDURE — 99232 SBSQ HOSP IP/OBS MODERATE 35: CPT | Performed by: PHYSICIAN ASSISTANT

## 2024-04-15 PROCEDURE — 2580000003 HC RX 258

## 2024-04-15 PROCEDURE — 36415 COLL VENOUS BLD VENIPUNCTURE: CPT

## 2024-04-15 PROCEDURE — 1100000000 HC RM PRIVATE

## 2024-04-15 PROCEDURE — 85025 COMPLETE CBC W/AUTO DIFF WBC: CPT

## 2024-04-15 PROCEDURE — 2580000003 HC RX 258: Performed by: FAMILY MEDICINE

## 2024-04-15 PROCEDURE — 99231 SBSQ HOSP IP/OBS SF/LOW 25: CPT | Performed by: INTERNAL MEDICINE

## 2024-04-15 PROCEDURE — 80048 BASIC METABOLIC PNL TOTAL CA: CPT

## 2024-04-15 PROCEDURE — 87040 BLOOD CULTURE FOR BACTERIA: CPT

## 2024-04-15 PROCEDURE — 6360000002 HC RX W HCPCS: Performed by: FAMILY MEDICINE

## 2024-04-15 PROCEDURE — 93308 TTE F-UP OR LMTD: CPT

## 2024-04-15 PROCEDURE — 6360000002 HC RX W HCPCS: Performed by: NURSE PRACTITIONER

## 2024-04-15 RX ADMIN — SODIUM CHLORIDE, PRESERVATIVE FREE 10 ML: 5 INJECTION INTRAVENOUS at 21:36

## 2024-04-15 RX ADMIN — ACETAMINOPHEN 650 MG: 325 TABLET ORAL at 05:25

## 2024-04-15 RX ADMIN — KETOROLAC TROMETHAMINE 30 MG: 30 INJECTION, SOLUTION INTRAMUSCULAR; INTRAVENOUS at 09:17

## 2024-04-15 RX ADMIN — KETOROLAC TROMETHAMINE 30 MG: 30 INJECTION, SOLUTION INTRAMUSCULAR; INTRAVENOUS at 14:31

## 2024-04-15 RX ADMIN — WATER 2000 MG: 1 INJECTION INTRAMUSCULAR; INTRAVENOUS; SUBCUTANEOUS at 16:04

## 2024-04-15 RX ADMIN — WATER 2000 MG: 1 INJECTION INTRAMUSCULAR; INTRAVENOUS; SUBCUTANEOUS at 09:17

## 2024-04-15 RX ADMIN — SODIUM CHLORIDE, PRESERVATIVE FREE 10 ML: 5 INJECTION INTRAVENOUS at 09:18

## 2024-04-15 RX ADMIN — VANCOMYCIN HYDROCHLORIDE 1250 MG: 1.25 INJECTION, POWDER, LYOPHILIZED, FOR SOLUTION INTRAVENOUS at 07:27

## 2024-04-15 RX ADMIN — ACETAMINOPHEN 650 MG: 325 TABLET ORAL at 21:30

## 2024-04-15 RX ADMIN — ACETAMINOPHEN 650 MG: 325 TABLET ORAL at 17:50

## 2024-04-15 RX ADMIN — KETOROLAC TROMETHAMINE 30 MG: 30 INJECTION, SOLUTION INTRAMUSCULAR; INTRAVENOUS at 05:25

## 2024-04-15 RX ADMIN — SODIUM CHLORIDE: 9 INJECTION, SOLUTION INTRAVENOUS at 05:13

## 2024-04-15 RX ADMIN — KETOROLAC TROMETHAMINE 30 MG: 30 INJECTION, SOLUTION INTRAMUSCULAR; INTRAVENOUS at 21:30

## 2024-04-15 ASSESSMENT — PAIN SCALES - GENERAL
PAINLEVEL_OUTOF10: 3
PAINLEVEL_OUTOF10: 4

## 2024-04-15 ASSESSMENT — PAIN DESCRIPTION - ORIENTATION: ORIENTATION: RIGHT

## 2024-04-15 ASSESSMENT — PAIN DESCRIPTION - LOCATION
LOCATION: HIP
LOCATION: HIP

## 2024-04-15 ASSESSMENT — PAIN DESCRIPTION - DESCRIPTORS: DESCRIPTORS: DISCOMFORT;DULL

## 2024-04-15 NOTE — CARE COORDINATION
Care Management Initial Assessment       RUR:  5%  Readmission? No  1st IM letter given? No  1st  letter given: No  Patient has [employer] insurance    Admission  effusion of sacroiliac joint      ID following    Cm met with patient in his room to introduce self and explain role.  Patient was alert and oriented.  Demographics, PCP and insurance confirmed.  Lives with his wife, Claudia and 19 year old son in their 2 level home..  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  He and wife have 3 adult sons.and good family support. He has no hx of HH. SNF/Rehab or DME    Patient is in agreement with home infusion and HH if ordered. No preference of agencies.  ( HH [facility] accepts patient's insurance)   ID following and CM will send referrals when ID plan is determined.      CM will follow and send referrals when transition of plan is determined and orders received.     Contact  Betito Jon  [phone]       04/15/24 1159   Service Assessment   Patient Orientation Alert and Oriented   Cognition Alert   History Provided By Patient   Primary Caregiver Self   Support Systems Spouse/Significant Other;Children   Patient's Healthcare Decision Maker is: Legal Next of Kin   PCP Verified by CM Yes   Last Visit to PCP Within last 3 months   Prior Functional Level Independent in ADLs/IADLs   Current Functional Level Independent in ADLs/IADLs   Can patient return to prior living arrangement Yes   Family able to assist with home care needs: Yes   Would you like for me to discuss the discharge plan with any other family members/significant others, and if so, who? Yes  (betito Jon [phone])   Financial Resources Other (Comment)  (BCBS)   Community Resources None   Social/Functional History   Lives With Spouse;Son  (19 year old son)   Home Equipment None   Receives Help From Family   ADL Assistance Independent   Homemaking

## 2024-04-15 NOTE — PLAN OF CARE
Problem: Skin/Tissue Integrity  Goal: Absence of new skin breakdown  Description: 1.  Monitor for areas of redness and/or skin breakdown  2.  Assess vascular access sites hourly  3.  Every 4-6 hours minimum:  Change oxygen saturation probe site  4.  Every 4-6 hours:  If on nasal continuous positive airway pressure, respiratory therapy assess nares and determine need for appliance change or resting period.  4/15/2024 0946 by Carolyn Combs RN  Outcome: Progressing  4/15/2024 0147 by Pippa Yanes LPN  Outcome: Progressing     Problem: Safety - Adult  Goal: Free from fall injury  4/15/2024 0946 by Carolyn Combs RN  Outcome: Progressing  4/15/2024 0147 by Pippa Yanes LPN  Outcome: Progressing     Problem: Pain  Goal: Verbalizes/displays adequate comfort level or baseline comfort level  4/15/2024 0946 by Carolyn Combs, RN  Outcome: Progressing  4/15/2024 0147 by Pippa Yanes LPN  Outcome: Progressing

## 2024-04-16 ENCOUNTER — TELEPHONE (OUTPATIENT)
Age: 59
End: 2024-04-16

## 2024-04-16 ENCOUNTER — APPOINTMENT (OUTPATIENT)
Facility: HOSPITAL | Age: 59
DRG: 872 | End: 2024-04-16
Attending: HOSPITALIST
Payer: COMMERCIAL

## 2024-04-16 LAB
ANION GAP SERPL CALC-SCNC: 2 MMOL/L (ref 5–15)
BACTERIA SPEC CULT: ABNORMAL
BASOPHILS # BLD: 0 K/UL (ref 0–0.1)
BASOPHILS NFR BLD: 0 % (ref 0–1)
BUN SERPL-MCNC: 12 MG/DL (ref 6–20)
BUN/CREAT SERPL: 16 (ref 12–20)
CALCIUM SERPL-MCNC: 8.5 MG/DL (ref 8.5–10.1)
CHLORIDE SERPL-SCNC: 109 MMOL/L (ref 97–108)
CO2 SERPL-SCNC: 28 MMOL/L (ref 21–32)
CREAT SERPL-MCNC: 0.77 MG/DL (ref 0.7–1.3)
DIFFERENTIAL METHOD BLD: ABNORMAL
EOSINOPHIL # BLD: 0.1 K/UL (ref 0–0.4)
EOSINOPHIL NFR BLD: 3 % (ref 0–7)
ERYTHROCYTE [DISTWIDTH] IN BLOOD BY AUTOMATED COUNT: 12.4 % (ref 11.5–14.5)
GLUCOSE SERPL-MCNC: 115 MG/DL (ref 65–100)
HCT VFR BLD AUTO: 33.9 % (ref 36.6–50.3)
HGB BLD-MCNC: 12.1 G/DL (ref 12.1–17)
IMM GRANULOCYTES # BLD AUTO: 0 K/UL (ref 0–0.04)
IMM GRANULOCYTES NFR BLD AUTO: 0 % (ref 0–0.5)
LYMPHOCYTES # BLD: 1.7 K/UL (ref 0.8–3.5)
LYMPHOCYTES NFR BLD: 32 % (ref 12–49)
MCH RBC QN AUTO: 32.4 PG (ref 26–34)
MCHC RBC AUTO-ENTMCNC: 35.7 G/DL (ref 30–36.5)
MCV RBC AUTO: 90.9 FL (ref 80–99)
MONOCYTES # BLD: 0.7 K/UL (ref 0–1)
MONOCYTES NFR BLD: 14 % (ref 5–13)
NEUTS SEG # BLD: 2.8 K/UL (ref 1.8–8)
NEUTS SEG NFR BLD: 51 % (ref 32–75)
NRBC # BLD: 0 K/UL (ref 0–0.01)
NRBC BLD-RTO: 0 PER 100 WBC
PLATELET # BLD AUTO: 186 K/UL (ref 150–400)
PMV BLD AUTO: 10.6 FL (ref 8.9–12.9)
POTASSIUM SERPL-SCNC: 3.9 MMOL/L (ref 3.5–5.1)
RBC # BLD AUTO: 3.73 M/UL (ref 4.1–5.7)
SERVICE CMNT-IMP: ABNORMAL
SERVICE CMNT-IMP: ABNORMAL
SODIUM SERPL-SCNC: 139 MMOL/L (ref 136–145)
WBC # BLD AUTO: 5.4 K/UL (ref 4.1–11.1)

## 2024-04-16 PROCEDURE — 80048 BASIC METABOLIC PNL TOTAL CA: CPT

## 2024-04-16 PROCEDURE — 2580000003 HC RX 258

## 2024-04-16 PROCEDURE — 2580000003 HC RX 258: Performed by: FAMILY MEDICINE

## 2024-04-16 PROCEDURE — 99231 SBSQ HOSP IP/OBS SF/LOW 25: CPT | Performed by: INTERNAL MEDICINE

## 2024-04-16 PROCEDURE — 1100000000 HC RM PRIVATE

## 2024-04-16 PROCEDURE — 36415 COLL VENOUS BLD VENIPUNCTURE: CPT

## 2024-04-16 PROCEDURE — 85025 COMPLETE CBC W/AUTO DIFF WBC: CPT

## 2024-04-16 PROCEDURE — 6370000000 HC RX 637 (ALT 250 FOR IP): Performed by: PHYSICIAN ASSISTANT

## 2024-04-16 PROCEDURE — 6360000002 HC RX W HCPCS: Performed by: NURSE PRACTITIONER

## 2024-04-16 PROCEDURE — 6360000002 HC RX W HCPCS

## 2024-04-16 RX ADMIN — WATER 2000 MG: 1 INJECTION INTRAMUSCULAR; INTRAVENOUS; SUBCUTANEOUS at 18:24

## 2024-04-16 RX ADMIN — ACETAMINOPHEN 650 MG: 325 TABLET ORAL at 20:56

## 2024-04-16 RX ADMIN — SODIUM CHLORIDE, PRESERVATIVE FREE 10 ML: 5 INJECTION INTRAVENOUS at 20:57

## 2024-04-16 RX ADMIN — ACETAMINOPHEN 650 MG: 325 TABLET ORAL at 15:03

## 2024-04-16 RX ADMIN — ACETAMINOPHEN 650 MG: 325 TABLET ORAL at 08:29

## 2024-04-16 RX ADMIN — SODIUM CHLORIDE, PRESERVATIVE FREE 10 ML: 5 INJECTION INTRAVENOUS at 08:31

## 2024-04-16 RX ADMIN — SODIUM CHLORIDE, PRESERVATIVE FREE 10 ML: 5 INJECTION INTRAVENOUS at 20:53

## 2024-04-16 RX ADMIN — KETOROLAC TROMETHAMINE 30 MG: 30 INJECTION, SOLUTION INTRAMUSCULAR; INTRAVENOUS at 08:29

## 2024-04-16 RX ADMIN — KETOROLAC TROMETHAMINE 30 MG: 30 INJECTION, SOLUTION INTRAMUSCULAR; INTRAVENOUS at 05:15

## 2024-04-16 RX ADMIN — WATER 2000 MG: 1 INJECTION INTRAMUSCULAR; INTRAVENOUS; SUBCUTANEOUS at 01:29

## 2024-04-16 RX ADMIN — KETOROLAC TROMETHAMINE 30 MG: 30 INJECTION, SOLUTION INTRAMUSCULAR; INTRAVENOUS at 20:56

## 2024-04-16 RX ADMIN — ACETAMINOPHEN 650 MG: 325 TABLET ORAL at 05:15

## 2024-04-16 RX ADMIN — KETOROLAC TROMETHAMINE 30 MG: 30 INJECTION, SOLUTION INTRAMUSCULAR; INTRAVENOUS at 15:03

## 2024-04-16 RX ADMIN — WATER 2000 MG: 1 INJECTION INTRAMUSCULAR; INTRAVENOUS; SUBCUTANEOUS at 08:34

## 2024-04-16 ASSESSMENT — PAIN SCALES - GENERAL
PAINLEVEL_OUTOF10: 0
PAINLEVEL_OUTOF10: 2

## 2024-04-16 ASSESSMENT — PAIN DESCRIPTION - ORIENTATION: ORIENTATION: MID;LOWER

## 2024-04-16 ASSESSMENT — PAIN DESCRIPTION - DESCRIPTORS: DESCRIPTORS: ACHING

## 2024-04-16 ASSESSMENT — PAIN DESCRIPTION - LOCATION: LOCATION: BACK

## 2024-04-16 NOTE — TELEPHONE ENCOUNTER
Pt would like for provider to come by his room to speak with him  Rm [id number]  Nurse directed to [facility] through [facility]

## 2024-04-17 VITALS
DIASTOLIC BLOOD PRESSURE: 73 MMHG | SYSTOLIC BLOOD PRESSURE: 122 MMHG | TEMPERATURE: 98.2 F | HEART RATE: 76 BPM | OXYGEN SATURATION: 98 % | RESPIRATION RATE: 17 BRPM

## 2024-04-17 LAB
ANION GAP SERPL CALC-SCNC: 3 MMOL/L (ref 5–15)
BASOPHILS # BLD: 0 K/UL (ref 0–0.1)
BASOPHILS NFR BLD: 0 % (ref 0–1)
BUN SERPL-MCNC: 12 MG/DL (ref 6–20)
BUN/CREAT SERPL: 17 (ref 12–20)
CALCIUM SERPL-MCNC: 9 MG/DL (ref 8.5–10.1)
CHLORIDE SERPL-SCNC: 108 MMOL/L (ref 97–108)
CO2 SERPL-SCNC: 28 MMOL/L (ref 21–32)
CREAT SERPL-MCNC: 0.71 MG/DL (ref 0.7–1.3)
DIFFERENTIAL METHOD BLD: ABNORMAL
EOSINOPHIL # BLD: 0.2 K/UL (ref 0–0.4)
EOSINOPHIL NFR BLD: 4 % (ref 0–7)
ERYTHROCYTE [DISTWIDTH] IN BLOOD BY AUTOMATED COUNT: 12.2 % (ref 11.5–14.5)
GLUCOSE SERPL-MCNC: 99 MG/DL (ref 65–100)
HCT VFR BLD AUTO: 35.6 % (ref 36.6–50.3)
HGB BLD-MCNC: 12.1 G/DL (ref 12.1–17)
IMM GRANULOCYTES # BLD AUTO: 0 K/UL (ref 0–0.04)
IMM GRANULOCYTES NFR BLD AUTO: 0 % (ref 0–0.5)
LYMPHOCYTES # BLD: 1.8 K/UL (ref 0.8–3.5)
LYMPHOCYTES NFR BLD: 31 % (ref 12–49)
MCH RBC QN AUTO: 31.3 PG (ref 26–34)
MCHC RBC AUTO-ENTMCNC: 34 G/DL (ref 30–36.5)
MCV RBC AUTO: 92.2 FL (ref 80–99)
MONOCYTES # BLD: 0.7 K/UL (ref 0–1)
MONOCYTES NFR BLD: 11 % (ref 5–13)
NEUTS SEG # BLD: 3.1 K/UL (ref 1.8–8)
NEUTS SEG NFR BLD: 54 % (ref 32–75)
NRBC # BLD: 0 K/UL (ref 0–0.01)
NRBC BLD-RTO: 0 PER 100 WBC
PLATELET # BLD AUTO: 202 K/UL (ref 150–400)
PMV BLD AUTO: 10.9 FL (ref 8.9–12.9)
POTASSIUM SERPL-SCNC: 3.6 MMOL/L (ref 3.5–5.1)
RBC # BLD AUTO: 3.86 M/UL (ref 4.1–5.7)
SODIUM SERPL-SCNC: 139 MMOL/L (ref 136–145)
WBC # BLD AUTO: 5.8 K/UL (ref 4.1–11.1)

## 2024-04-17 PROCEDURE — 36415 COLL VENOUS BLD VENIPUNCTURE: CPT

## 2024-04-17 PROCEDURE — 80048 BASIC METABOLIC PNL TOTAL CA: CPT

## 2024-04-17 PROCEDURE — 2709999900 HC NON-CHARGEABLE SUPPLY

## 2024-04-17 PROCEDURE — 2580000003 HC RX 258

## 2024-04-17 PROCEDURE — 6360000002 HC RX W HCPCS

## 2024-04-17 PROCEDURE — 6370000000 HC RX 637 (ALT 250 FOR IP): Performed by: PHYSICIAN ASSISTANT

## 2024-04-17 PROCEDURE — 2580000003 HC RX 258: Performed by: FAMILY MEDICINE

## 2024-04-17 PROCEDURE — 02HV33Z INSERTION OF INFUSION DEVICE INTO SUPERIOR VENA CAVA, PERCUTANEOUS APPROACH: ICD-10-PCS | Performed by: FAMILY MEDICINE

## 2024-04-17 PROCEDURE — 99231 SBSQ HOSP IP/OBS SF/LOW 25: CPT | Performed by: INTERNAL MEDICINE

## 2024-04-17 PROCEDURE — C1751 CATH, INF, PER/CENT/MIDLINE: HCPCS

## 2024-04-17 PROCEDURE — 85025 COMPLETE CBC W/AUTO DIFF WBC: CPT

## 2024-04-17 PROCEDURE — 6360000002 HC RX W HCPCS: Performed by: NURSE PRACTITIONER

## 2024-04-17 PROCEDURE — 76937 US GUIDE VASCULAR ACCESS: CPT

## 2024-04-17 RX ORDER — KETOROLAC TROMETHAMINE 10 MG/1
10 TABLET, FILM COATED ORAL EVERY 6 HOURS PRN
Qty: 20 TABLET | Refills: 0 | Status: SHIPPED | OUTPATIENT
Start: 2024-04-17 | End: 2024-04-22

## 2024-04-17 RX ADMIN — SODIUM CHLORIDE, PRESERVATIVE FREE 10 ML: 5 INJECTION INTRAVENOUS at 08:25

## 2024-04-17 RX ADMIN — KETOROLAC TROMETHAMINE 30 MG: 30 INJECTION, SOLUTION INTRAMUSCULAR; INTRAVENOUS at 08:24

## 2024-04-17 RX ADMIN — WATER 2000 MG: 1 INJECTION INTRAMUSCULAR; INTRAVENOUS; SUBCUTANEOUS at 02:53

## 2024-04-17 RX ADMIN — WATER 2000 MG: 1 INJECTION INTRAMUSCULAR; INTRAVENOUS; SUBCUTANEOUS at 08:24

## 2024-04-17 RX ADMIN — KETOROLAC TROMETHAMINE 30 MG: 30 INJECTION, SOLUTION INTRAMUSCULAR; INTRAVENOUS at 02:54

## 2024-04-17 RX ADMIN — ACETAMINOPHEN 650 MG: 325 TABLET ORAL at 08:25

## 2024-04-17 ASSESSMENT — PAIN DESCRIPTION - DESCRIPTORS: DESCRIPTORS: ACHING

## 2024-04-17 ASSESSMENT — PAIN SCALES - GENERAL: PAINLEVEL_OUTOF10: 6

## 2024-04-17 ASSESSMENT — PAIN DESCRIPTION - ORIENTATION: ORIENTATION: MID

## 2024-04-17 NOTE — PROCEDURES
Ultrasound-guided bedside PICC placement with Bard Sherlock 3CG TCS    Consent obtained from patient after risks of  possible DVT, air embolism, infection, arterial puncture and catheter malposition are explained and all questions answered.    PRE-PROCEDURE VERIFICATION  Correct Procedure: yes  Correct Site: yes  Temperature: Temp: 98.2 °F (36.8 °C), Temperature Source:    Recent Labs     04/17/24  0307   BUN 12      WBC 5.8       Allergies: Penicillins  Education materials, including PICC Booklet, for PICC Care given to patient: yes.   See Patient Education activity for further details.    PROCEDURE DETAIL  PICC placed using modified Seldinger method.  A single lumen PICC line was started for Home IV Therapy. The following documentation is in addition to the PICC properties in the lines/airways flowsheet :  Lot #: [id number]  Was xylocaine 1% used intradermally: yes  Catheter to vein ratio: 22%  Arm Circumference 10 cm above AC: 36.5 (cm)  Total Catheter Length: 47 (cm)  External Catheter Length: 0 (cm)  Vein Selection for PICC: right basilic  Central Line Bundle followed: yes  Complication Related to Insertion: none    The placement was verified by ECG  technology: The tip location is on the right side and the tip is in the  superior vena cava. See ECG results for PICC tip placement.    Report given to nurse.    Line is okay to use.    BERYL CAROLINA RN

## 2024-04-17 NOTE — DISCHARGE INSTRUCTIONS
Discharge Instructions       PATIENT ID: Leon Jon  MRN: [id number]   YOB: 1965    DATE OF ADMISSION: 4/13/2024   DATE OF DISCHARGE: 4/17/2024    PRIMARY CARE PROVIDER: Jitendra Mcgregor     ATTENDING PHYSICIAN: Yeyo Busch MD   DISCHARGING PROVIDER: REED Kenny NP    To contact this individual call [phone] and ask the  to page.   If unavailable ask to be transferred the Adult Hospitalist Department.    DISCHARGE DIAGNOSES     Sacroiliitis with fluid collection and fever   MSSA Bacteremia     CONSULTATIONS:     Infectious Disease   Hospitalist Medicine  Orthopedic Surgery   Interventional Radiology     PROCEDURES/SURGERIES: * No surgery found *    PENDING TEST RESULTS:   At the time of discharge the following test results are still pending: none    FOLLOW UP APPOINTMENTS:   As below     ADDITIONAL CARE RECOMMENDATIONS:        Signed         INFECTIOUS DISEASE discharge plan:     Diagnosis: Right Sacroliitis with fluid collection/MSSA bacteremia     Antibiotic: cefazolin IV 2 g Q 8 H through 5/26/24     PICC line insertion for outpatient antibiotic.      Routine PICC/ Martinez/ Portacath Care including PRN catheter flow management     Weekly labs with results fax to # [phone]. Call critical lab values to [phone].   [x] CBC w/diff  [x] BUN/Creatinine  [x] AST, ALT  [] CPK  [x] CRP, ESR  [] Vancomycin trough level goal  drawn every Monday and Thursday. Pharm D consult for Vancomycin dosing.      Home Health to pull PICC line after last dose or send to IR for Martinez removal     May send to IR for line evaluation or replacement PRN Phone # [phone]     Follow up with Infectious Disease                 For pain and swelling , you can take Toradol tablets every 6 hours as needed for the next five days. After this time you will need to switch to ibuprofen ( which is also in the same drug class as toradol but a little less harsh on the kidneys.) You can take

## 2024-04-17 NOTE — CARE COORDINATION
Transition of Care Plan:    RUR: 3%  Prior Level of Functioning: independent  Disposition: Home with HH  Follow up appointments: per physician recommendation   DME needed: N/A  Transportation at discharge: family   IM/IMM Medicare/ letter given: N/A  Caregiver Contact: Wife Claudia Jon [phone]   Barriers to discharge: PICC placement     CM reviewed chart and discussed patient in IDRs. Patient is planning to discharge with IV abx. Final ID order has been placed. PICC placement pending. CM has sent referral to [facility] and [facility]- pending. CM met with patient to discuss discharge planning.     Noted: Lives with his wife, Claudia and 19 year old son in their 2 level home..  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  He and wife have 3 adult sons.and good family support. He has no hx of HH. SNF/Rehab or DME     11:40AM: [facility] has accepted for HH SN, [facility] has accepted for infusion supplies, CM added them to AVS. PICC has been placed, pending [facility] teaching.     2:30PM: [facility] has completed PICC teaching.      04/17/24 1048   Condition of Participation: Discharge Planning   The Plan for Transition of Care is related to the following treatment goals: Home health and Infusion company   The Patient and/or Patient Representative was provided with a Choice of Provider? Patient   The Patient and/Or Patient Representative agree with the Discharge Plan? Yes   Freedom of Choice list was provided with basic dialogue that supports the patient's individualized plan of care/goals, treatment preferences, and shares the quality data associated with the providers?  Yes   Sonia Montaño RN/CRM

## 2024-04-17 NOTE — PROGRESS NOTES
[facility]                                                                                          Hospitalist Progress Note  REED Kwan NP  Answering service: [phone] OR [phone] from in house phone        Date of Service:  2024  NAME:  Leon Jon  :  1965  MRN:  [id number]       Admission Summary:   Leon Jon is a 58 y.o. male with no significant past medical history except for UTI presented as a direct admission/transfer from [facility] ER to [facility] with chief complaint of fever, right lower back/buttock pain and right lower extremity numbness and weakness.  Patient reportedly had a workout performing dead lifts on , 2024.  On Monday, 2024, patient reported onset of right lower back pain, radiating down right buttock, right thigh.  Pain remain constant, severe, aggravated with any movement, present at rest, without specific alleviating factors.  He reportedly was seen by PCP, was prescribed Medrol Dosepak and hydrocodone.  Afterwards, patient reportedly had fever with temperature 102.7 °F.  Per report, his PCP instructed him to stop steroids.  Symptoms have not resolved.  This morning, he reportedly had temperature of 100.7 °F.  He notably had taken Tylenol and hydrocodone without relief.  He went to [place] ED.  There per ER MD exam, patient had weakness in his right hip flexors with absent right patellar reflex.  Labs were abnormal for CRP 6.31.  Urinalysis showed moderate blood but otherwise negative UA.  Chest reportable showed no acute cardiopulmonary process.  MRI thoracic and lumbar spine with and without IV contrast showed edema and enhancement associated with partially imaged right sacroiliitis, possibly degenerative versus infectious etiology with mild spinal degenerative disease, but no cortical erosion to suggest septic arthritis.  ED reportedly requested initial transfer 
I prayed with eLon and celebrated with him the Anointing of the Sick.  Father Dirk Wagner  
Infectious Diseases Follow Up    2024      Assessment & Plan:     58 y.o. male with no significant past medical history except for UTI was a direct admission/transfer from [facility] to [facility] [place] for sacroiliitis.     MSSA bacteremia  + blood cx 2/2 bottles  Repeat blood Cx pending  TTE negative for vegetation  continue cefazolin    Right Sacroiliitis with fluid collection  MRI spine no acute processes  MRI pelvis small amount of fluid within right sacroiliac joint  IR consult for possible aspiration, per ortho due to location may be technically difficult, no surgical intervention  Continue cefazolin, anticipate discharge on cefazolin IV for 6 weeks per Dr. Jackson.     Allergies  Penicillin with swelling            Subjective:     Patient up in chair, primary team at bedside. Answered several questions regarding antibiotic therapy.     Objective:     Vitals: /71   Pulse 83   Temp 97.7 °F (36.5 °C) (Oral)   Resp 15   SpO2 99%      Tmax:  Temp (24hrs), Av.2 °F (36.8 °C), Min:97.7 °F (36.5 °C), Max:98.6 °F (37 °C)      Exam:   Patient is intubated:  no    Exam:    General:  Alert, cooperative, well nourished, NAD   Eyes:  Sclera anicteric. Pupils equally round and reactive to light.   Mouth/Throat: Mucous membranes normal, oral pharynx clear   Neck: Supple   Lungs:   Clear to auscultation bilaterally, good effort   CV:  Regular rate and rhythm,no murmur, click, rub or gallop   Abdomen:   Soft, non-tender. bowel sounds normal. non-distended, R lower back tenderness   Extremities: No cyanosis or edema   Skin: Skin color, texture, turgor normal. no acute rash or lesions   Lymph nodes: Deferred   Musculoskeletal: No swelling or deformity   Lines/Devices:  Intact, no erythema, drainage or tenderness   Psych: Alert and oriented, normal mood affect given the setting           Labs:          Recent Labs     24  0608 04/15/24  0717 24  0130    138 139   K 4.4 3.8 
Infectious Diseases Follow Up    2024      Assessment & Plan:     58 y.o. male with no significant past medical history except for UTI was a direct admission/transfer from [facility] to [facility] for sacroiliitis.     MSSA bacteremia  + blood cx 2/2 bottles  Repeat blood Cx pending  TTE negative for vegetation  See plan below     Right Sacroiliitis with fluid collection  MRI spine no acute processes  MRI pelvis small amount of fluid within right sacroiliac joint  Primary team discussed with IR, inflammation vs fluid collection, not able to aspirate  Per ortho no surgical intervention  Continue cefazolin for 6 weeks for possible septic arthritis through 24 inclusive per Dr. Jackson. Abx discharge plan in place.     Allergies  Penicillin with swelling            Subjective:     Patient up in chair, no complaints    Objective:     Vitals: /73   Pulse 76   Temp 98.2 °F (36.8 °C) (Oral)   Resp 17   SpO2 98%      Tmax:  Temp (24hrs), Av.6 °F (36.4 °C), Min:97 °F (36.1 °C), Max:98.2 °F (36.8 °C)      Exam:   Patient is intubated:  no    Exam:    General:  Alert, cooperative, well nourished, NAD   Eyes:  Sclera anicteric. Pupils equally round and reactive to light.   Mouth/Throat: Mucous membranes normal, oral pharynx clear   Neck: Supple   Lungs:   Clear to auscultation bilaterally, good effort   CV:  Regular rate and rhythm,no murmur, click, rub or gallop   Abdomen:   Soft, non-tender. bowel sounds normal. non-distended, R lower back tenderness   Extremities: No cyanosis or edema   Skin: Skin color, texture, turgor normal. no acute rash or lesions   Lymph nodes: Deferred   Musculoskeletal: No swelling or deformity   Lines/Devices:  Intact, no erythema, drainage or tenderness   Psych: Alert and oriented, normal mood affect given the setting           Labs:          Recent Labs     04/15/24  0717 24  0130 24  0307    139 139   K 3.8 3.9 3.6   * 109* 
Infectious Diseases Follow Up    4/15/2024      Assessment & Plan:     58 y.o. male with no significant past medical history except for UTI was a direct admission/transfer from [facility] to [facility] for sacroiliitis.     MSSA bacteremia  + blood cx 2/2 bottles  Repeat blood Cx pending  TTE pending  Stopped vancomycin, levofloxacin, started cefazolin    Right Sacroiliitis with fluid collection  MRI spine no acute processes  MRI pelvis small amount of fluid within right sacroiliac joint  IR consult for possible aspiration, per ortho due to location may be technically difficult, no surgical intervention  Continue cefazolin, abx discharge plan TBD    Allergies  Penicillin with swelling            Subjective:     Patient up in chair, wife at bedside, answered multiple questions.     Objective:     Vitals: /68   Pulse 80   Temp 98.3 °F (36.8 °C) (Oral)   Resp 18   SpO2 97%      Tmax:  Temp (24hrs), Av.3 °F (36.8 °C), Min:98.2 °F (36.8 °C), Max:98.3 °F (36.8 °C)      Exam:   Patient is intubated:  no    Exam:    General:  Alert, cooperative, well nourished, NAD   Eyes:  Sclera anicteric. Pupils equally round and reactive to light.   Mouth/Throat: Mucous membranes normal, oral pharynx clear   Neck: Supple   Lungs:   Clear to auscultation bilaterally, good effort   CV:  Regular rate and rhythm,no murmur, click, rub or gallop   Abdomen:   Soft, non-tender. bowel sounds normal. non-distended, R lower back tenderness   Extremities: No cyanosis or edema   Skin: Skin color, texture, turgor normal. no acute rash or lesions   Lymph nodes: Deferred   Musculoskeletal: No swelling or deformity   Lines/Devices:  Intact, no erythema, drainage or tenderness   Psych: Alert and oriented, normal mood affect given the setting           Labs:        Invalid input(s): \"ITNL\"   No results for input(s): \"CPK\", \"CKMB\" in the last 72 hours.    Invalid input(s): \"TROIQ\"  Recent Labs     24  1009 
Ortho Spine      MRIs reviewed by Dr. Willson. No drainable collection. No surgical intervention needed at this time. Continue with recommendations per ID.     Discussed with Dr. Willson.      Luz Warren PA-C   
Pharmacist Note - Vancomycin Dosing    Consult provided for this 58 y.o. male for indication of sepsis.  Antibiotic regimen(s): Vancomycin + Levaquin  Patient on vancomycin PTA? NO     Recent Labs     24  1009   WBC 4.9   CREATININE 1.07   BUN 14     Frequency of BMP: daily  Height: 190.5 cm  Weight: 108 kg  Est CrCl: 101 ml/min  Temp (24hrs), Av.9 °F (37.7 °C), Min:99 °F (37.2 °C), Max:100.9 °F (38.3 °C)    Cultures: blood x 2      MRSA Swab ordered (if applicable)? N/A    The plan below is expected to result in a target range of AUC/NANCY 400-600    Therapy will be initiated with a loading dose of 2500 mg IV x 1 to be followed by a maintenance dose of 1250 mg IV every 12 hours.  Pharmacy to follow patient daily and order levels / make dose adjustments as appropriate.    *Vancomycin has been dosed used Bayesian kinetics software to target an AUC/NANCY of 400-600, which provides adequate exposure for an assumed infection due to MRSA with an NANCY of 1 or less while reducing the risk of nephrotoxicity as seen with traditional trough based dosing goals.     
Spiritual Care Assessment/Progress Note  [facility]    Name: Leon Jon MRN: [id number]    Age: 58 y.o.     Sex: male   Language: English     Date: 4/15/2024            Total Time Calculated: 5 min              Spiritual Assessment begun in [facility] 5S ORTHO JOINT  Service Provided For:: Patient  Referral/Consult From:: Clergy/  Encounter Overview/Reason : Rituals, Rites and Sacraments    Spiritual beliefs:      [x] Involved in a prdaeep tradition/spiritual practice: Caholic     [] Supported by a pradeep community:      [] Claims no spiritual orientation:      [] Seeking spiritual identity:           [] Adheres to an individual form of spirituality:      [] Not able to assess:                Identified resources for coping and support system:   Support System: Spouse       [x] Prayer                  [] Devotional reading               [x] Music                  [] Guided Imagery     [] Pet visits                                        [] Other: (COMMENT)     Specific area/focus of visit   Encounter:    Crisis:    Spiritual/Emotional needs: Type: Spiritual Support  Ritual, Rites and Sacraments: Type: Jewish Communion  Grief, Loss, and Adjustments:    Ethics/Mediation:    Behavioral Health:    Palliative Care:    Advance Care Planning:      Plan/Referrals: Continue to visit, (comment)    Narrative: Mr. Jon was sitting up in his chair working on his computer.  He declined communion today and said his wife brought him communion yesterday so he is okay.  No further spiritual requests at this time. Let him know Fr. Wagner would be here on Wed. And offered the Mass schedule at [facility] on ch 41 at 12:30     Sr. SHADE Drake, RN, ACSW, LCSW   Page:  [phone]    
IntraVENous PRN    0.9 % sodium chloride infusion   IntraVENous PRN    0.9 % sodium chloride infusion   IntraVENous Continuous    HYDROmorphone HCl PF (DILAUDID) injection 0.5 mg  0.5 mg IntraVENous Q4H PRN       LAB:    Recent Labs     04/14/24  0608 04/15/24  0717   HCT 36.4* 37.3   HGB 12.6 13.0   INR 1.2*  --        Transfuse PRBC's:      Assessment & Physician's Comment:  Neurovascular checks within normal limits  Orientation:  Oriented  Awake and alert seen mobilizing in room doing relatively well; still with right-sided lower back pain with pain with active flexion of hip as well as activation of hamstrings on the right.  Intact sensory function.  5/5 strength for ankle plantar and dorsiflexion.    Principal Problem:    Sacroiliitis (HCC)  Active Problems:    Lumbar back pain with radiculopathy affecting right lower extremity    Bacteremia due to methicillin susceptible Staphylococcus aureus (MSSA)  Resolved Problems:    * No resolved hospital problems. *      Plan:    Right sided sacroiliitis with fluid collection    Patient now with positive blood cultures as well for staph species.  ID following and has on Ancef.  In an effort to identify if pelvic fluid collection is at the sink species aspiration attempts will be discussed with infectious disease service to see if feasible.  Due to location of fluid this may be technically difficult.  Will continue on current antibiotics  Mobilize as tolerates  Medical management per primary team  Continue following but at this time there are no plans for orthopedic surgical intervention as any surgical intervention would be exceptionally invasive and possibly of low yield    Dr. Nathan Hopkins aware of patient in agreement current plan of care    Hari Nichols PA-C  Orthopedic Trauma Service  [facility]    
alert.      Sensory: Sensory deficit present.      Gait: Gait abnormal.      Comments: Pain in lower back with radiation down to foot, attests to numbness in RLE with difference in sensation, attests to have pain with walking but denies weakness in RLE        Data Review:    Review and/or order of clinical lab test      I have personally and independently reviewed all pertinent labs, diagnostic studies, imaging, and have provided independent interpretation of the same.     Labs:     Recent Labs     04/13/24  1009 04/14/24  0608   WBC 4.9 4.3   HGB 14.1 12.6   HCT 41.9 36.4*    159     Recent Labs     04/13/24  1009 04/14/24  0608    139   K 3.6 4.4   CL 98 107   CO2 29 29   BUN 14 11     Recent Labs     04/14/24  0608   ALT 62   GLOB 3.3     Recent Labs     04/14/24  0608   INR 1.2*   APTT 27.6      No results for input(s): \"TIBC\", \"FERR\" in the last 72 hours.    Invalid input(s): \"FE\", \"PSAT\"   No results found for: \"FOL\", \"RBCF\"   No results for input(s): \"PH\", \"PCO2\", \"PO2\" in the last 72 hours.  No results for input(s): \"CPK\" in the last 72 hours.    Invalid input(s): \"CPKMB\", \"CKNDX\", \"TROIQ\"  No results found for: \"CHOL\", \"CHOLX\", \"CHLST\", \"CHOLV\", \"HDL\", \"HDLC\", \"LDL\", \"LDLC\", \"TGLX\", \"TRIGL\"  No results found for: \"GLUCPOC\"  [unfilled]    Notes reviewed from all clinical/nonclinical/nursing services involved in patient's clinical care. Care coordination discussions were held with appropriate clinical/nonclinical/ nursing providers based on care coordination needs.         Patients current active Medications were reviewed, considered, added and adjusted based on the clinical condition today.      Home Medications were reconciled to the best of my ability given all available resources at the time of admission. Route is PO if not otherwise noted.      Admission Status:[id number]:::1}      Medications Reviewed:     Current Facility-Administered Medications   Medication Dose Route Frequency    
order of clinical lab test      I have personally and independently reviewed all pertinent labs, diagnostic studies, imaging, and have provided independent interpretation of the same.     Labs:     Recent Labs     04/14/24  0608 04/15/24  0717   WBC 4.3 5.6   HGB 12.6 13.0   HCT 36.4* 37.3    170       Recent Labs     04/13/24  1009 04/14/24  0608 04/15/24  0717    139 138   K 3.6 4.4 3.8   CL 98 107 110*   CO2 29 29 23   BUN 14 11 11       Recent Labs     04/14/24  0608   ALT 62   GLOB 3.3       Recent Labs     04/14/24  0608   INR 1.2*   APTT 27.6        No results for input(s): \"TIBC\", \"FERR\" in the last 72 hours.    Invalid input(s): \"FE\", \"PSAT\"   No results found for: \"FOL\", \"RBCF\"   No results for input(s): \"PH\", \"PCO2\", \"PO2\" in the last 72 hours.  No results for input(s): \"CPK\" in the last 72 hours.    Invalid input(s): \"CPKMB\", \"CKNDX\", \"TROIQ\"  No results found for: \"CHOL\", \"CHOLX\", \"CHLST\", \"CHOLV\", \"HDL\", \"HDLC\", \"LDL\", \"LDLC\", \"TGLX\", \"TRIGL\"  No results found for: \"GLUCPOC\"    Notes reviewed from all clinical/nonclinical/nursing services involved in patient's clinical care. Care coordination discussions were held with appropriate clinical/nonclinical/ nursing providers based on care coordination needs.     Patients current active Medications were reviewed, considered, added and adjusted based on the clinical condition today.      Home Medications were reconciled to the best of my ability given all available resources at the time of admission. Route is PO if not otherwise noted.    Medications Reviewed:     Current Facility-Administered Medications   Medication Dose Route Frequency    ceFAZolin (ANCEF) 2,000 mg in sterile water 20 mL IV syringe  2,000 mg IntraVENous Q8H    sodium chloride flush 0.9 % injection 5-40 mL  5-40 mL IntraVENous 2 times per day    sodium chloride flush 0.9 % injection 5-40 mL  5-40 mL IntraVENous PRN    0.9 % sodium chloride infusion   IntraVENous PRN

## 2024-04-17 NOTE — PLAN OF CARE
Problem: Discharge Planning  Goal: Discharge to home or other facility with appropriate resources  4/16/2024 2101 by Hoda Duenas RN  Outcome: Progressing  4/16/2024 0854 by Jackie Heaton RN  Outcome: Progressing     Problem: Skin/Tissue Integrity  Goal: Absence of new skin breakdown  Description: 1.  Monitor for areas of redness and/or skin breakdown  2.  Assess vascular access sites hourly  3.  Every 4-6 hours minimum:  Change oxygen saturation probe site  4.  Every 4-6 hours:  If on nasal continuous positive airway pressure, respiratory therapy assess nares and determine need for appliance change or resting period.  4/16/2024 2101 by oHda Duenas, RN  Outcome: Progressing  4/16/2024 0854 by Jackie Heaton RN  Outcome: Progressing     Problem: Safety - Adult  Goal: Free from fall injury  4/16/2024 2101 by Hoda Duenas RN  Outcome: Progressing  4/16/2024 0854 by Jackie Heaton, RN  Outcome: Progressing     Problem: Pain  Goal: Verbalizes/displays adequate comfort level or baseline comfort level  4/16/2024 2101 by Hoda Duenas, RN  Outcome: Progressing  4/16/2024 0854 by Jackie Heaton, RN  Outcome: Progressing

## 2024-04-17 NOTE — PLAN OF CARE
INFECTIOUS DISEASE discharge plan:    Diagnosis: Right Sacroliitis with fluid collection/MSSA bacteremia    Antibiotic: cefazolin IV 2 g Q 8 H through 5/26/24    PICC line insertion for outpatient antibiotic.     Routine PICC/ Martinez/ Portacath Care including PRN catheter flow management    Weekly labs with results fax to # [phone]. Call critical lab values to [phone].   [x] CBC w/diff  [x] BUN/Creatinine  [x] AST, ALT  [] CPK  [x] CRP, ESR  [] Vancomycin trough level goal  drawn every Monday and Thursday. Pharm D consult for Vancomycin dosing.     Home Health to pull PICC line after last dose or send to IR for Martinez removal    May send to IR for line evaluation or replacement PRN Phone # [phone]    Follow up with Infectious Disease     INFECTIOUS DISEASE Attending:     I agree with the above infectious disease daily progress note in its entirety as authored by and discussed in detail with the nurse practitioner.   I have reviewed pertinent laboratory studies, microbiology cultures, radiologic reports with review of the consultations and progress notes as appropriate.   I agree with today's subjective findings, physical examination, assessment and plan of care as described above and discussed extensively with the nurse practitioner.     Roz Jackson MD  4/23/2024  2:13 PM

## 2024-04-19 NOTE — DISCHARGE SUMMARY
Discharge Summary       PATIENT ID: Leon Jon  MRN: [id number]   YOB: 1965    DATE OF ADMISSION: 4/13/2024  7:41 PM    DATE OF DISCHARGE: 4/17/2024  PRIMARY CARE PROVIDER: Jitendra Mcgregor MD     ATTENDING PHYSICIAN: Dr. Busch  DISCHARGING PROVIDER: REED Kenny NP    To contact this individual call [phone] and ask the  to page.  If unavailable ask to be transferred the Adult Hospitalist Department.    CONSULTATIONS: PHARMACY TO DOSE VANCOMYCIN  IP CONSULT TO ORTHOPEDIC SURGERY  IP CONSULT TO INFECTIOUS DISEASES  IP CONSULT TO PHARMACY  IP CONSULT TO PICC TEAM  IP CONSULT TO CASE MANAGEMENT  IP CONSULT TO CASE MANAGEMENT    PROCEDURES/SURGERIES: * No surgery found *     ADMITTING DIAGNOSES & HOSPITAL COURSE:     Leon Jon is a 58 y.o. male with no significant past medical history except for UTI presented as a direct admission/transfer from [facility] ER to [facility] with chief complaint of fever, right lower back/buttock pain and right lower extremity numbness and weakness.  Patient reportedly had a workout performing dead lifts on Sunday, 4/7/2024.  On Monday, 4/8/2024, patient reported onset of right lower back pain, radiating down right buttock, right thigh.  Pain remain constant, severe, aggravated with any movement, present at rest, without specific alleviating factors.  He reportedly was seen by PCP, was prescribed Medrol Dosepak and hydrocodone.  Afterwards, patient reportedly had fever with temperature 102.7 °F.  Per report, his PCP instructed him to stop steroids.  Symptoms have not resolved.  This morning, he reportedly had temperature of 100.7 °F.  He notably had taken Tylenol and hydrocodone without relief.  He went to [facility].  There per ER MD exam, patient had weakness in his right hip flexors with absent right patellar reflex.  Labs were abnormal for CRP 6.31.  Urinalysis showed moderate blood but otherwise

## 2024-04-20 LAB
BACTERIA SPEC CULT: NORMAL
SERVICE CMNT-IMP: NORMAL

## 2024-04-24 ENCOUNTER — TELEPHONE (OUTPATIENT)
Age: 59
End: 2024-04-24

## 2024-04-26 ENCOUNTER — TELEPHONE (OUTPATIENT)
Age: 59
End: 2024-04-26

## 2024-05-22 ENCOUNTER — OFFICE VISIT (OUTPATIENT)
Age: 59
End: 2024-05-22
Payer: COMMERCIAL

## 2024-05-22 VITALS
SYSTOLIC BLOOD PRESSURE: 122 MMHG | HEART RATE: 73 BPM | RESPIRATION RATE: 18 BRPM | OXYGEN SATURATION: 97 % | DIASTOLIC BLOOD PRESSURE: 86 MMHG

## 2024-05-22 DIAGNOSIS — M46.1 SACROILIITIS (HCC): Primary | ICD-10-CM

## 2024-05-22 DIAGNOSIS — B95.61 BACTEREMIA DUE TO METHICILLIN SUSCEPTIBLE STAPHYLOCOCCUS AUREUS (MSSA): ICD-10-CM

## 2024-05-22 DIAGNOSIS — R78.81 BACTEREMIA DUE TO METHICILLIN SUSCEPTIBLE STAPHYLOCOCCUS AUREUS (MSSA): ICD-10-CM

## 2024-05-22 PROCEDURE — 99214 OFFICE O/P EST MOD 30 MIN: CPT | Performed by: INTERNAL MEDICINE

## 2024-05-22 RX ORDER — CEPHALEXIN 500 MG/1
2000 CAPSULE ORAL ONCE
Qty: 4 CAPSULE | Refills: 3 | Status: SHIPPED | OUTPATIENT
Start: 2024-05-22 | End: 2024-05-22

## 2024-05-22 NOTE — PROGRESS NOTES
[facility] Progress Note  Ramsey Garcia MD   Phone [phone]  Fax [phone]      5/22/2024      Assessment & Plan:     58 y.o. male seen for follow up visit for right sacroiliitis with high grade MSSA bacteremia and fever without TTE abnormalities pending completion of 6 week Cefazolin course.    Finish Cefazolin 2g IV q8hrs on 5/26/24, inclusive then remove PICC line with home health RN.    Repeat MRI pelvis with and without contrast.    Perhaps this may actually be a dental source?  No other clear alternative source except skin minor traumas.  Keflex 2000mg PO x1 dose to be taken ~30 minutes prior to upcoming dental procedures.              Sacroiliitis (HCC)  -     MRI PELVIS W WO CONTRAST; Future  Bacteremia due to methicillin susceptible Staphylococcus aureus (MSSA)  -     MRI PELVIS W WO CONTRAST; Future       Subjective:     57 y/o male without significant medical history seen for follow up.  Admitted from 4/13-4/19/24 @ [facility] for fever, back pain to buttock region.  Was previously exercising at gym.  Symptoms improved but still feels as if residual symptoms, unclear if infectious vs post-infectious after effects of symptoms.  Did have dental procedure in the month preceding onset of symptoms.  Tolerating Cefazolin well.    Generally healthy.  Runs a construction company.      Objective:     Vitals: /86 (Site: Left Upper Arm, Position: Sitting, Cuff Size: Medium Adult)   Pulse 73   Resp 18   SpO2 97%       Physical Examination:   General:  NAD   Head:  Normocephalic, atraumatic.   Eyes:  Conjunctivae clear   Neck: Supple   Lungs:   No distress    Chest wall:  No tenderness or deformity.   Heart:  Regular rate   Abdomen:   Soft, non-tender, non-distended   Extremities: Mild if any right sacroiliac/gluteal TTP   Skin: No rashes or lesions, PICC line RUE clean, dressed   Neurologic: Moving all extremities     Labs:          Medications       Current Outpatient

## 2024-05-31 ENCOUNTER — HOSPITAL ENCOUNTER (OUTPATIENT)
Facility: HOSPITAL | Age: 59
Discharge: HOME | End: 2024-05-31
Attending: INTERNAL MEDICINE
Payer: COMMERCIAL

## 2024-05-31 DIAGNOSIS — M46.1 SACROILIITIS (HCC): ICD-10-CM

## 2024-05-31 DIAGNOSIS — B95.61 BACTEREMIA DUE TO METHICILLIN SUSCEPTIBLE STAPHYLOCOCCUS AUREUS (MSSA): ICD-10-CM

## 2024-05-31 DIAGNOSIS — R78.81 BACTEREMIA DUE TO METHICILLIN SUSCEPTIBLE STAPHYLOCOCCUS AUREUS (MSSA): ICD-10-CM

## 2024-05-31 PROCEDURE — 72197 MRI PELVIS W/O & W/DYE: CPT

## 2024-05-31 PROCEDURE — A9579 GAD-BASE MR CONTRAST NOS,1ML: HCPCS | Performed by: INTERNAL MEDICINE

## 2024-05-31 PROCEDURE — 6360000004 HC RX CONTRAST MEDICATION: Performed by: INTERNAL MEDICINE

## 2024-05-31 RX ADMIN — GADOTERIDOL 20 ML: 279.3 INJECTION, SOLUTION INTRAVENOUS at 12:40

## 2024-06-04 ENCOUNTER — TELEPHONE (OUTPATIENT)
Age: 59
End: 2024-06-04

## 2024-06-05 ENCOUNTER — TELEPHONE (OUTPATIENT)
Facility: HOSPITAL | Age: 59
End: 2024-06-05

## 2024-06-05 NOTE — TELEPHONE ENCOUNTER
Called back patient - mentioned that I do not have his MRI results yet and I am awaiting radiologist's report.    He is doing OK otherwise off of antibiotics.

## 2024-06-06 ENCOUNTER — TELEPHONE (OUTPATIENT)
Age: 59
End: 2024-06-06

## 2024-06-06 NOTE — TELEPHONE ENCOUNTER
Called back patient - right arm where PICC line was with right forearm tight and swollen.  Not quite painful.      Suspicious for clot - advised ED evaluation to get US and if clot found to be treated with anticoagulants.

## 2024-06-06 NOTE — TELEPHONE ENCOUNTER
Right arm swollen near area where the piccline was. Piccline was removed on 05/27/2024.   Slight discoloration: redness.     Please return call to advise.   Phone: [phone]